# Patient Record
Sex: MALE
[De-identification: names, ages, dates, MRNs, and addresses within clinical notes are randomized per-mention and may not be internally consistent; named-entity substitution may affect disease eponyms.]

---

## 2019-03-25 ENCOUNTER — HOSPITAL ENCOUNTER (INPATIENT)
Dept: HOSPITAL 5 - ED | Age: 59
LOS: 4 days | Discharge: HOME | DRG: 682 | End: 2019-03-29
Attending: INTERNAL MEDICINE | Admitting: INTERNAL MEDICINE
Payer: COMMERCIAL

## 2019-03-25 DIAGNOSIS — E11.9: ICD-10-CM

## 2019-03-25 DIAGNOSIS — I48.91: ICD-10-CM

## 2019-03-25 DIAGNOSIS — M19.90: ICD-10-CM

## 2019-03-25 DIAGNOSIS — Z99.81: ICD-10-CM

## 2019-03-25 DIAGNOSIS — Z79.4: ICD-10-CM

## 2019-03-25 DIAGNOSIS — I27.20: ICD-10-CM

## 2019-03-25 DIAGNOSIS — N17.0: Primary | ICD-10-CM

## 2019-03-25 DIAGNOSIS — Z71.3: ICD-10-CM

## 2019-03-25 DIAGNOSIS — Z82.49: ICD-10-CM

## 2019-03-25 DIAGNOSIS — F17.213: ICD-10-CM

## 2019-03-25 DIAGNOSIS — K59.00: ICD-10-CM

## 2019-03-25 DIAGNOSIS — I13.0: ICD-10-CM

## 2019-03-25 DIAGNOSIS — E66.01: ICD-10-CM

## 2019-03-25 DIAGNOSIS — E11.22: ICD-10-CM

## 2019-03-25 DIAGNOSIS — Z79.01: ICD-10-CM

## 2019-03-25 DIAGNOSIS — I50.43: ICD-10-CM

## 2019-03-25 DIAGNOSIS — L97.322: ICD-10-CM

## 2019-03-25 DIAGNOSIS — J44.9: ICD-10-CM

## 2019-03-25 DIAGNOSIS — J96.20: ICD-10-CM

## 2019-03-25 DIAGNOSIS — N18.9: ICD-10-CM

## 2019-03-25 LAB
ALBUMIN SERPL-MCNC: 3.1 G/DL (ref 3.9–5)
ALT SERPL-CCNC: 13 UNITS/L (ref 7–56)
BASOPHILS # (AUTO): 0.1 K/MM3 (ref 0–0.1)
BASOPHILS NFR BLD AUTO: 0.9 % (ref 0–1.8)
BILIRUB UR QL STRIP: (no result)
BLOOD UR QL VISUAL: (no result)
BUN SERPL-MCNC: 57 MG/DL (ref 9–20)
BUN/CREAT SERPL: 30 %
CALCIUM SERPL-MCNC: 8.8 MG/DL (ref 8.4–10.2)
EOSINOPHIL # BLD AUTO: 0.1 K/MM3 (ref 0–0.4)
EOSINOPHIL NFR BLD AUTO: 1.3 % (ref 0–4.3)
HCT VFR BLD CALC: 54.6 % (ref 35.5–45.6)
HEMOLYSIS INDEX: 54
HGB BLD-MCNC: 16.9 GM/DL (ref 11.8–15.2)
INR PPP: 1.04 (ref 0.87–1.13)
LYMPHOCYTES # BLD AUTO: 1.2 K/MM3 (ref 1.2–5.4)
LYMPHOCYTES NFR BLD AUTO: 20.1 % (ref 13.4–35)
MCH RBC QN AUTO: 26 PG (ref 28–32)
MCHC RBC AUTO-ENTMCNC: 31 % (ref 32–34)
MCV RBC AUTO: 84 FL (ref 84–94)
MONOCYTES # (AUTO): 0.8 K/MM3 (ref 0–0.8)
MONOCYTES % (AUTO): 14 % (ref 0–7.3)
MUCOUS THREADS #/AREA URNS HPF: (no result) /HPF
PH UR STRIP: 5 [PH] (ref 5–7)
PLATELET # BLD: 152 K/MM3 (ref 140–440)
PROT UR STRIP-MCNC: (no result) MG/DL
RBC # BLD AUTO: 6.5 M/MM3 (ref 3.65–5.03)
RBC #/AREA URNS HPF: 3 /HPF (ref 0–6)
T4 FREE SERPL-MCNC: 1.15 NG/DL (ref 0.76–1.46)
UROBILINOGEN UR-MCNC: 2 MG/DL (ref ?–2)
WBC #/AREA URNS HPF: 1 /HPF (ref 0–6)

## 2019-03-25 PROCEDURE — 80053 COMPREHEN METABOLIC PANEL: CPT

## 2019-03-25 PROCEDURE — 81001 URINALYSIS AUTO W/SCOPE: CPT

## 2019-03-25 PROCEDURE — 94760 N-INVAS EAR/PLS OXIMETRY 1: CPT

## 2019-03-25 PROCEDURE — 93306 TTE W/DOPPLER COMPLETE: CPT

## 2019-03-25 PROCEDURE — 83735 ASSAY OF MAGNESIUM: CPT

## 2019-03-25 PROCEDURE — 80048 BASIC METABOLIC PNL TOTAL CA: CPT

## 2019-03-25 PROCEDURE — 36415 COLL VENOUS BLD VENIPUNCTURE: CPT

## 2019-03-25 PROCEDURE — 83036 HEMOGLOBIN GLYCOSYLATED A1C: CPT

## 2019-03-25 PROCEDURE — 84439 ASSAY OF FREE THYROXINE: CPT

## 2019-03-25 PROCEDURE — 93005 ELECTROCARDIOGRAM TRACING: CPT

## 2019-03-25 PROCEDURE — 96374 THER/PROPH/DIAG INJ IV PUSH: CPT

## 2019-03-25 PROCEDURE — 85025 COMPLETE CBC W/AUTO DIFF WBC: CPT

## 2019-03-25 PROCEDURE — 93010 ELECTROCARDIOGRAM REPORT: CPT

## 2019-03-25 PROCEDURE — 99406 BEHAV CHNG SMOKING 3-10 MIN: CPT

## 2019-03-25 PROCEDURE — 84443 ASSAY THYROID STIM HORMONE: CPT

## 2019-03-25 PROCEDURE — 83880 ASSAY OF NATRIURETIC PEPTIDE: CPT

## 2019-03-25 PROCEDURE — 80162 ASSAY OF DIGOXIN TOTAL: CPT

## 2019-03-25 PROCEDURE — 71046 X-RAY EXAM CHEST 2 VIEWS: CPT

## 2019-03-25 PROCEDURE — 85610 PROTHROMBIN TIME: CPT

## 2019-03-25 PROCEDURE — 99291 CRITICAL CARE FIRST HOUR: CPT

## 2019-03-25 PROCEDURE — 84484 ASSAY OF TROPONIN QUANT: CPT

## 2019-03-25 PROCEDURE — 82962 GLUCOSE BLOOD TEST: CPT

## 2019-03-25 PROCEDURE — 94640 AIRWAY INHALATION TREATMENT: CPT

## 2019-03-25 RX ADMIN — FUROSEMIDE SCH MG: 20 TABLET ORAL at 21:45

## 2019-03-25 RX ADMIN — Medication SCH ML: at 22:04

## 2019-03-25 RX ADMIN — CARVEDILOL SCH MG: 3.12 TABLET, FILM COATED ORAL at 22:02

## 2019-03-25 NOTE — EMERGENCY DEPARTMENT REPORT
Chief Complaint: Dyspnea/Respdistress


Stated Complaint: COPD/SWELLING/PAIN


Time Seen by Provider: 03/25/19 11:45





- HPI


History of Present Illness: 





states her doctor sent her here to be admitted. 


states he called ER "Dr FLETCHER"





co cp rad to neck





see EMR for hx





pt is complaining about a nurse with no SMILEY


VSS





MSE completed


MSE screening note: 


Focused history and physical exam performed.


Due to findings the following was ordered:











ED Disposition for MSE


Condition: Stable What Is The Reason For Today's Visit?: Surveillance against skin cancer recurrences How Many Skin Cancers Have You Had?: one

## 2019-03-25 NOTE — HISTORY AND PHYSICAL REPORT
History of Present Illness


Chief complaint: 





I feel bloated, short of breath and im constipated too. 


History of present illness: 


60 YO Male with MO, DM, Renal Failure, Atrial Fib on Therapeutic 

Anticoatulation(Xarelto), CHF, Nicotine Dependence presents to ED for 

evaluation. Pt states that he has experienced shortness of breath over the past 

1 month, with worsening symptoms over the past 2 weeks. Pt acknowledges 40lbs 

weight gain over the past 1 month, dypsnea on exertion, dypsnea at rest, leg 

edema, Orthopnea/PND, decreased exercise tolerance, as well as shortness of 

breath. Pt also acknowledge constipation over the past 4 days. Pt transported to

[facility] by private vehicle. Pt seen and evalauted in ED and found to have symptoms 

consistent with CHF Decompensation as well as Renal failure. Pt denies fever, 

chills, chest pain, palpitation,  abdominal pain, hematochezia, prolonged 

travel/immobility, unilateral leg swelling, calf pain, dysuria, frequency, 

individual/family history of DVT/PE/Blood Clotting Disorders, focal weakness, 

dysarthria. Pt admitted to telemetry and initiated on CHF protocol. Cardiology 

consulted in ED. Nephrology consulted in ED. Admission on 6/28/17 reviewed. All 

listed medication reconciled at time of admission. 








Past History


Past Medical History: atrial fib, diabetes, heart failure, renal failure


Past Surgical History: No surgical history, Other (reviewed)


Social history: single, smoking.  denies: alcohol abuse, prescription drug abuse


Family history: hypertension





Medications and Allergies


                                    Allergies











Allergy/AdvReac Type Severity Reaction Status Date / Time


 


Penicillins Allergy  Rash Verified 05/09/17 08:30











                                Home Medications











 Medication  Instructions  Recorded  Confirmed  Last Taken  Type


 


AtorvaSTATin [Lipitor] 10 mg PO HS 05/09/17 03/25/19 05/08/17 History


 


Rivaroxaban [Xarelto] 20 mg PO QDAY 05/09/17 03/25/19 05/08/17 History


 


Carvedilol [Coreg] 3.125 mg PO BID #60 tablet 06/29/17 03/25/19 Unknown Rx


 


Digoxin [Digox] 250 mcg PO DAILY 03/25/19 03/25/19 Unknown History


 


Insulin Detemir [Levemir VIAL] 20 unit SQ QHS 03/25/19 03/25/19 Unknown History


 


Ipratropium/Albuterol Sulfate 1 spray IH QID 03/25/19 03/25/19 Unknown History





[Combivent Respimat]     


 


Lispro Insulin [Humalog] 8 unit SQ AC 03/25/19 03/25/19 Unknown History


 


Rivaroxaban [Xarelto] 20 mg PO QDAY 03/25/19 03/25/19 Unknown History


 


Spironolactone [Aldactone] 25 mg PO QDAY 03/25/19 03/25/19 Unknown History


 


Torsemide [Demadex] 20 mg PO DAILY 03/25/19 03/25/19 Unknown History


 


metOLazone [Metolazone] 5 mg PO DAILY 03/25/19 03/25/19 Unknown History


 


predniSONE [Deltasone] 20 mg PO QDAY 03/25/19 03/25/19 Unknown History














Review of Systems


Constitutional: weight gain, no weight loss, no fever, no chills, no sweats, no 

night sweats


Ears, nose, mouth and throat: no ear pain, no ear discharge, no tinnitis, no 

decreased hearing, no nose pain


Cardiovascular: orthopnea, edema, shortness of breath, dyspnea on exertion, 

paroxysmal nocturnal dyspnea, leg edema, decreased exercise tolerance, no chest 

pain


Respiratory: no cough, no cough with sputum, no excessive sputum


Gastrointestinal: constipation, no abdominal pain, no nausea, no vomiting, no di

arrhea


Genitourinary Male: no dysuria, no hematuria, no flank pain, no urinary 

frequency, no urinary hesitancy


Rectal: no pain, no incontinence, no bleeding


Musculoskeletal: no neck stiffness, no neck pain, no shooting arm pain, no arm 

numbness/tingling, no low back pain, no shooting leg pain, no redness of joints


Integumentary: no rash, no pruritis, no redness, no sores, no wounds


Neurological: no head injury, no transient paralysis, no paralysis, no weakness,

 no parathesias, no tremors


Psychiatric: no anxiety, no memory loss, no change in appetite, no change in 

libido, no disorientation


Endocrine: no cold intolerance, no heat intolerance, no polyphagia, no excessive

 thirst, no polydipsia, no polyuria


Hematologic/Lymphatic: no easy bruising, no easy bleeding, no lymphadenopathy


Allergic/Immunologic: no urticaria, no allergic rhinitis, no anaphylaxis





Exam





- Constitutional


Vitals: 


                                        











Temp Pulse Resp BP Pulse Ox


 


 98.2 F   73   20   115/57   93 


 


 03/25/19 11:58  03/25/19 13:00  03/25/19 13:00  03/25/19 13:00  03/25/19 13:00











General appearance: Present: mild distress, obese





- EENT


Eyes: Present: PERRL


ENT: hearing intact, clear oral mucosa





- Neck


Neck: Present: supple, normal ROM





- Respiratory


Respiratory effort: labored


Respiratory: bilateral: diminished, rhonchi





- Cardiovascular


Rhythm: irregularly irregular


Heart Sounds: Present: S1 & S2.  Absent: rub, click





- Extremities


Extremity abnormal: edema


Peripheral Pulses: within normal limits





- Abdominal


General gastrointestinal: Present: soft, non-tender, non-distended, normal bowel

 sounds


Male genitourinary: Present: normal





- Integumentary


Integumentary: Present: clear, warm, dry





- Musculoskeletal


Musculoskeletal: gait normal, strength equal bilaterally





- Psychiatric


Psychiatric: appropriate mood/affect, intact judgment & insight





- Neurologic


Neurologic: CNII-XII intact, moves all extremities





Results





- Labs


CBC & Chem 7: 


                                 03/25/19 12:45





                                 03/25/19 12:45


Labs: 


                              Abnormal lab results











  03/25/19 03/25/19 03/25/19 Range/Units





  12:45 12:45 13:35 


 


RBC  6.50 H    (3.65-5.03)  M/mm3


 


Hgb  16.9 H    (11.8-15.2)  gm/dl


 


Hct  54.6 H    (35.5-45.6)  %


 


MCH  26 L    (28-32)  pg


 


MCHC  31 L    (32-34)  %


 


RDW  19.5 H    (13.2-15.2)  %


 


Mono % (Auto)  14.0 H    (0.0-7.3)  %


 


Sodium   136 L   (137-145)  mmol/L


 


Potassium   5.1 H   (3.6-5.0)  mmol/L


 


Chloride   94.0 L   ()  mmol/L


 


BUN   57 H   (9-20)  mg/dL


 


Creatinine   1.9 H   (0.8-1.5)  mg/dL


 


Glucose   199 H   ()  mg/dL


 


POC Glucose    186 H  ()  


 


NT-Pro-B Natriuret Pep   3763 H   (0-900)  pg/mL


 


Total Protein   6.1 L   (6.3-8.2)  g/dL


 


Albumin   3.1 L   (3.9-5)  g/dL














Assessment and Plan





- Patient Problems


(1) Acute exacerbation of CHF (congestive heart failure)


Current Visit: Yes   Status: Suspected   


Qualifiers: 


   Heart failure type: systolic   Qualified Code(s): I50.23 - Acute on chronic 

systolic (congestive) heart failure   


Plan to address problem: 


Admit to telemetry, Cardiology consulted in ED, digoxin level, thyroid panel, 

Echo, strict I/O, daily weight, afterload reduction, monitor for negative fluid 

balance, chest x ray, supplemental oxygen, BNP. 








(2) Nicotine dependence with withdrawal


Current Visit: Yes   Status: Acute   


Qualifiers: 


   Nicotine product type: cigarettes   Qualified Code(s): F17.213 - Nicotine 

dependence, cigarettes, with withdrawal   


Plan to address problem: 


Smoking cessation counseling, supportive care. 








(3) ARF (acute renal failure) with tubular necrosis


Current Visit: Yes   Status: Acute   


Plan to address problem: 


IVF resuscitation therapy as tolerated, Nephrology consulted in ED. monitor uop 

q shift,








(4) A-fib


Current Visit: No   Status: Acute   


Qualifiers: 


   Atrial fibrillation type: persistent   Qualified Code(s): I48.1 - Persistent 

atrial fibrillation   


Plan to address problem: 


Admit to telemetry, cardiology consulted, rate controlled, continue therapeutic 

anticoagulation, 








(5) Diabetes


Current Visit: No   Status: Acute   


Plan to address problem: 


ADA diet, insulin, accu check, hgb A1c








(6) Constipation


Current Visit: Yes   Status: Acute   


Qualifiers: 


   Constipation type: unspecified constipation type   Qualified Code(s): K59.00 

- Constipation, unspecified   


Plan to address problem: 


Bowel regimen, IVF resuscitation therapy as tolerated.








(7) DVT prophylaxis


Current Visit: Yes   Status: Acute   


Plan to address problem: 


SCD to BLE while in bed, therapeutic oral anticoagulation.

## 2019-03-25 NOTE — EMERGENCY DEPARTMENT REPORT
ED General Adult HPI





- General


Chief complaint: Dyspnea/Respdistress


Stated complaint: COPD/SWELLING/PAIN


Time Seen by Provider: 03/25/19 11:45


Source: patient, RN notes reviewed, old records reviewed


Mode of arrival: Wheelchair


Limitations: No Limitations





- History of Present Illness


Initial comments: 





This is a 59-year-old gentleman.





Primary care Dr.: Dr Thomas





Does not have a local cardiologist.





Past medical history: Obesity, diabetes, renal insufficiency, permanent atrial 

fibrillation, on systemic anticoagulation; xarelto





This is a pleasant 59-year-old gentleman who is not known to this provider 

previously.  The patient presents to the emergency room with a complaint of 

unintentional lower extremity swelling, scrotal swelling, shortness of breath, 

"I think I'm fluid overloaded."  He endorses an unintentional 40 pound weight 

gain within the past month or so.  The patient is a , however he 

endorses compliance with his systemic anticoagulation.  He denies headache, neck

pain, chest pain, abdominal pain.  He believes that he is constipated.  He also 

reports that his symptoms got worse approximately 2-3 weeks ago, after receiving

an intragluteal injection, on the right side, for "low testosterone."


-: Gradual


Location: left, right, lower extremity


Severity scale (0 -10): 6


Consistency: constant


Improves with: rest


Worsens with: movement


Associated Symptoms: shortness of breath.  denies: chest pain





- Related Data


                                Home Medications











 Medication  Instructions  Recorded  Confirmed  Last Taken


 


Albuterol Sulfate [Ventolin HFA] 2 puff INHALATION PRN 05/09/17 06/28/17 05/08/17


 


AtorvaSTATin [Lipitor] 10 mg PO HS 05/09/17 06/28/17 05/08/17


 


Rivaroxaban [Xarelto] 20 mg PO QDAY 05/09/17 06/28/17 05/08/17


 


Zolpidem [Ambien] 10 mg PO QHS 05/09/17 06/28/17 05/08/17


 


amLODIPine [Norvasc] 1 tab PO DAILY 05/09/17 06/28/17 Unknown


 


Spironolactone [Aldactone] 50 mg PO QDAY 06/28/17 06/28/17 Unknown








                                  Previous Rx's











 Medication  Instructions  Recorded  Last Taken  Type


 


Detemir (Nf) [Levemir (Nf)] 12 units SUB-Q QHS #30 units 05/11/17 Unknown Rx


 


Carvedilol [Coreg] 3.125 mg PO BID #60 tablet 06/29/17 Unknown Rx


 


HYDROcodone/APAP  [Norco 2 tab PO TID PRN #14 tablet 06/29/17 Unknown Rx





 mg TAB]    


 


Nicotine [Habitrol] 21 mg TD QDAY #14 patch 06/29/17 Unknown Rx


 


Torsemide [Demadex] 50 mg PO QDAY 30 Days  tablet 06/30/17 Unknown Rx











                                    Allergies











Allergy/AdvReac Type Severity Reaction Status Date / Time


 


Penicillins Allergy  Rash Verified 05/09/17 08:30














ED Review of Systems


ROS: 


Stated complaint: COPD/SWELLING/PAIN


Other details as noted in HPI





Constitutional: denies: fever


Eyes: denies: vision change


ENT: denies: epistaxis


Respiratory: shortness of breath


Cardiovascular: dyspnea on exertion, edema.  denies: chest pain, orthopnea


Gastrointestinal: constipation.  denies: abdominal pain


Skin: lesions (chronic skin lesion secondary to long-term anticoagulant use)


Neurological: weakness


Psychiatric: denies: anxiety





ED Past Medical Hx





- Past Medical History


Hx Hypertension: Yes


Hx Congestive Heart Failure: Yes


Hx Diabetes: Yes


Hx Arthritis: Yes


Hx COPD: Yes


Additional medical history: Back pain, Kidney insufficiency, Home oxygen, A fib





- Surgical History


Past Surgical History?: No





- Social History


Smoking Status: Unknown if ever smoked


Substance Use Type: None





- Medications


Home Medications: 


                                Home Medications











 Medication  Instructions  Recorded  Confirmed  Last Taken  Type


 


Albuterol Sulfate [Ventolin HFA] 2 puff INHALATION PRN 05/09/17 06/28/17 05/08/17 History


 


AtorvaSTATin [Lipitor] 10 mg PO HS 05/09/17 06/28/17 05/08/17 History


 


Rivaroxaban [Xarelto] 20 mg PO QDAY 05/09/17 06/28/17 05/08/17 History


 


Zolpidem [Ambien] 10 mg PO QHS 05/09/17 06/28/17 05/08/17 History


 


amLODIPine [Norvasc] 1 tab PO DAILY 05/09/17 06/28/17 Unknown History


 


Detemir (Nf) [Levemir (Nf)] 12 units SUB-Q QHS #30 units 05/11/17 06/28/17 

Unknown Rx


 


Spironolactone [Aldactone] 50 mg PO QDAY 06/28/17 06/28/17 Unknown History


 


Carvedilol [Coreg] 3.125 mg PO BID #60 tablet 06/29/17  Unknown Rx


 


HYDROcodone/APAP  [Norco 2 tab PO TID PRN #14 tablet 06/29/17  Unknown Rx





 mg TAB]     


 


Nicotine [Habitrol] 21 mg TD QDAY #14 patch 06/29/17  Unknown Rx


 


Torsemide [Demadex] 50 mg PO QDAY 30 Days  tablet 06/30/17  Unknown Rx














ED Physical Exam





- General


Limitations: No Limitations


General appearance: alert, in no apparent distress, obese





- Head


Head exam: Present: atraumatic, normocephalic





- Eye


Eye exam: Present: normal appearance, EOMI.  Absent: nystagmus





- ENT


ENT exam: Present: normal exam, normal orophraynx, mucous membranes moist, 

normal external ear exam





- Neck


Neck exam: Present: normal inspection, full ROM.  Absent: tenderness, 

meningismus





- Respiratory


Respiratory exam: Present: rales, rhonchi.  Absent: respiratory distress





- Cardiovascular


Cardiovascular Exam: Present: tachycardia, irregular rhythm, normal heart 

sounds.  Absent: systolic murmur, diastolic murmur, rubs, gallop





- GI/Abdominal


GI/Abdominal exam: Present: soft.  Absent: distended, tenderness, guarding, 

rebound, rigid, pulsatile mass





- Rectal


Rectal exam: Present: deferred





- 


 exam: Present: scrotal swelling





- Extremities Exam


Extremities exam: Present: normal inspection (chronic discoloration noted), 

pedal edema (3+ pulses noted in the lower extremities), other (2+ pulses noted 

in the bilateral upper, lower extremities.  Compartments soft.  No long bony 

tenderness.  The pelvis is stable.).  Absent: calf tenderness





- Back Exam


Back exam: Present: normal inspection, full ROM.  Absent: tenderness, CVA 

tenderness (R), paraspinal tenderness, vertebral tenderness





- Neurological Exam


Neurological exam: Present: alert, oriented X3, CN II-XII intact, normal gait, 

other (Extraocular movements intact.  Tongue midline.  No facial droop.  Facial 

sensation intact to light touch in the V1, V2, V3 distribution bilaterally.  5 

and 5 strength in 4 extremities..  Sensation is intact to light touch in 4 

extremities.).  Absent: motor sensory deficit





- Psychiatric


Psychiatric exam: Present: normal affect, normal mood





- Skin


Skin exam: Present: warm, dry, intact, normal color.  Absent: rash





ED Course


                                   Vital Signs











  03/25/19 03/25/19 03/25/19





  11:58 12:51 13:00


 


Temperature 98.2 F  


 


Pulse Rate 103 H  73


 


Respiratory 24  20





Rate   


 


Blood Pressure 135/70  115/57


 


O2 Sat by Pulse 81 L 93 93





Oximetry   














ED Medical Decision Making





- Lab Data


Result diagrams: 


                                 03/25/19 12:45





                                 03/25/19 12:45








                                   Vital Signs











  03/25/19 03/25/19 03/25/19





  11:58 12:51 13:00


 


Temperature 98.2 F  


 


Pulse Rate 103 H  73


 


Respiratory 24  20





Rate   


 


Blood Pressure 135/70  115/57


 


O2 Sat by Pulse 81 L 93 93





Oximetry   











                                   Lab Results











  03/25/19 03/25/19 03/25/19 Range/Units





  12:35 12:45 12:45 


 


WBC   5.9   (4.5-11.0)  K/mm3


 


RBC   6.50 H   (3.65-5.03)  M/mm3


 


Hgb   16.9 H   (11.8-15.2)  gm/dl


 


Hct   54.6 H   (35.5-45.6)  %


 


MCV   84   (84-94)  fl


 


MCH   26 L   (28-32)  pg


 


MCHC   31 L   (32-34)  %


 


RDW   19.5 H   (13.2-15.2)  %


 


Plt Count   152   (140-440)  K/mm3


 


Lymph % (Auto)   20.1   (13.4-35.0)  %


 


Mono % (Auto)   14.0 H   (0.0-7.3)  %


 


Eos % (Auto)   1.3   (0.0-4.3)  %


 


Baso % (Auto)   0.9   (0.0-1.8)  %


 


Lymph #   1.2   (1.2-5.4)  K/mm3


 


Mono #   0.8   (0.0-0.8)  K/mm3


 


Eos #   0.1   (0.0-0.4)  K/mm3


 


Baso #   0.1   (0.0-0.1)  K/mm3


 


Seg Neutrophils %   63.7   (40.0-70.0)  %


 


Seg Neutrophils #   3.7   (1.8-7.7)  K/mm3


 


PT     (12.2-14.9)  Sec.


 


INR     (0.87-1.13)  


 


Sodium    136 L  (137-145)  mmol/L


 


Potassium    5.1 H  (3.6-5.0)  mmol/L


 


Chloride    94.0 L  ()  mmol/L


 


Carbon Dioxide    29  (22-30)  mmol/L


 


Anion Gap    18  mmol/L


 


BUN    57 H  (9-20)  mg/dL


 


Creatinine    1.9 H  (0.8-1.5)  mg/dL


 


Estimated GFR    37  ml/min


 


BUN/Creatinine Ratio    30  %


 


Glucose    199 H  ()  mg/dL


 


POC Glucose     ()  


 


Calcium    8.8  (8.4-10.2)  mg/dL


 


Magnesium    2.20  (1.7-2.3)  mg/dL


 


Total Bilirubin    0.60  (0.1-1.2)  mg/dL


 


AST    19  (5-40)  units/L


 


ALT    13  (7-56)  units/L


 


Alkaline Phosphatase    63  ()  units/L


 


Troponin T    < 0.010  (0.00-0.029)  ng/mL


 


NT-Pro-B Natriuret Pep    3763 H  (0-900)  pg/mL


 


Total Protein    6.1 L  (6.3-8.2)  g/dL


 


Albumin    3.1 L  (3.9-5)  g/dL


 


Albumin/Globulin Ratio    1.0  %


 


Urine Color  Yellow    (Yellow)  


 


Urine Turbidity  Clear    (Clear)  


 


Urine pH  5.0    (5.0-7.0)  


 


Ur Specific Gravity  1.013    (1.003-1.030)  


 


Urine Protein  >2000 mg dl    (Negative)  mg/dL


 


Urine Glucose (UA)  Neg    (Negative)  mg/dL


 


Urine Ketones  Neg    (Negative)  mg/dL


 


Urine Blood  Neg    (Negative)  


 


Urine Nitrite  Neg    (Negative)  


 


Urine Bilirubin  Neg    (Negative)  


 


Urine Urobilinogen  2.0    (<2.0)  mg/dL


 


Ur Leukocyte Esterase  Neg    (Negative)  


 


Urine WBC (Auto)  1.0    (0.0-6.0)  /HPF


 


Urine RBC (Auto)  3.0    (0.0-6.0)  /HPF


 


U Epithel Cells (Auto)  < 1.0    (0-13.0)  /HPF


 


Urine Mucus  Few    /HPF














  03/25/19 03/25/19 Range/Units





  12:45 13:35 


 


WBC    (4.5-11.0)  K/mm3


 


RBC    (3.65-5.03)  M/mm3


 


Hgb    (11.8-15.2)  gm/dl


 


Hct    (35.5-45.6)  %


 


MCV    (84-94)  fl


 


MCH    (28-32)  pg


 


MCHC    (32-34)  %


 


RDW    (13.2-15.2)  %


 


Plt Count    (140-440)  K/mm3


 


Lymph % (Auto)    (13.4-35.0)  %


 


Mono % (Auto)    (0.0-7.3)  %


 


Eos % (Auto)    (0.0-4.3)  %


 


Baso % (Auto)    (0.0-1.8)  %


 


Lymph #    (1.2-5.4)  K/mm3


 


Mono #    (0.0-0.8)  K/mm3


 


Eos #    (0.0-0.4)  K/mm3


 


Baso #    (0.0-0.1)  K/mm3


 


Seg Neutrophils %    (40.0-70.0)  %


 


Seg Neutrophils #    (1.8-7.7)  K/mm3


 


PT  14.2   (12.2-14.9)  Sec.


 


INR  1.04   (0.87-1.13)  


 


Sodium    (137-145)  mmol/L


 


Potassium    (3.6-5.0)  mmol/L


 


Chloride    ()  mmol/L


 


Carbon Dioxide    (22-30)  mmol/L


 


Anion Gap    mmol/L


 


BUN    (9-20)  mg/dL


 


Creatinine    (0.8-1.5)  mg/dL


 


Estimated GFR    ml/min


 


BUN/Creatinine Ratio    %


 


Glucose    ()  mg/dL


 


POC Glucose   186 H  ()  


 


Calcium    (8.4-10.2)  mg/dL


 


Magnesium    (1.7-2.3)  mg/dL


 


Total Bilirubin    (0.1-1.2)  mg/dL


 


AST    (5-40)  units/L


 


ALT    (7-56)  units/L


 


Alkaline Phosphatase    ()  units/L


 


Troponin T    (0.00-0.029)  ng/mL


 


NT-Pro-B Natriuret Pep    (0-900)  pg/mL


 


Total Protein    (6.3-8.2)  g/dL


 


Albumin    (3.9-5)  g/dL


 


Albumin/Globulin Ratio    %


 


Urine Color    (Yellow)  


 


Urine Turbidity    (Clear)  


 


Urine pH    (5.0-7.0)  


 


Ur Specific Gravity    (1.003-1.030)  


 


Urine Protein    (Negative)  mg/dL


 


Urine Glucose (UA)    (Negative)  mg/dL


 


Urine Ketones    (Negative)  mg/dL


 


Urine Blood    (Negative)  


 


Urine Nitrite    (Negative)  


 


Urine Bilirubin    (Negative)  


 


Urine Urobilinogen    (<2.0)  mg/dL


 


Ur Leukocyte Esterase    (Negative)  


 


Urine WBC (Auto)    (0.0-6.0)  /HPF


 


Urine RBC (Auto)    (0.0-6.0)  /HPF


 


U Epithel Cells (Auto)    (0-13.0)  /HPF


 


Urine Mucus    /HPF














- EKG Data


-: EKG Interpreted by Me


Rate: tachycardia





- EKG Data





03/25/19 13:35


Normal axis, atrial fibrillation, normal intervals, low voltage, motion 

artifact, not consistent with ST elevation myocardial infarction.





- Radiology Data


Radiology results: pending, report reviewed, image reviewed


interpreted by me: 





X-ray of the chest shows pulmonary vascular congestion





- Medical Decision Making





Differential diagnosis, including but not limited to: Cardiorenal syndrome, 

congestive heart failure, fluid overload





Assessment and plan: 39-year-old gentleman with history and physical suggestive 

of congestive heart failure exacerbation, and fluid overload.  While I am 

talking to the patient, he is saturating at 90-92% on supplemental oxygen.  He 

is on systemic anticoagulation, and endorses no pulmonary embolus or DVT risk 

factors.  He will be treated with supplemental oxygen, supportive care, and 

aggressive IV diuresis.  I have recommended diet last somewhat occasions to the 

patient, an aggressive weight loss.





He is amenable to hospitalization.





Hospital physician, Dr. Nobles has graciously accepted the patient to the medical

 service for decompensated congestive heart failure.


Critical Care Time: Yes


Critical care time in (mins) excluding proc time.: 35


Critical care attestation.: 


If time is entered above; I have spent that time in minutes in the direct care o

f this critically ill patient, excluding procedure time.








ED Disposition


Clinical Impression: 


 Chronic atrial fibrillation, Permanent atrial fibrillation, O2 dependent, 

Anticoagulant long-term use, Morbid obesity, Acute exacerbation of CHF 

(congestive heart failure)





Disposition: [id number] OP ADMIT IP TO THIS HOSP


Is pt being admited?: Yes


Condition: Good


Referrals: 


PRIMARY CARE,MD [Referring] - 3-5 Days

## 2019-03-25 NOTE — XRAY REPORT
ROUTINE CHEST, TWO VIEWS:



HISTORY:  chest pain.



Moderate cardiomegaly and mild pulmonary venous congestion are 

identified which appear relatively stable since 6/28/17. No evidence 

for consolidation, large pleural effusion or pneumothorax. The bony 

structures are grossly intact.



IMPRESSION:

Cardiomegaly and pulmonary venous congestion. No overwhelming change 

since 6/28/17.

## 2019-03-26 LAB
ALBUMIN SERPL-MCNC: 3 G/DL (ref 3.9–5)
ALT SERPL-CCNC: 11 UNITS/L (ref 7–56)
BUN SERPL-MCNC: 58 MG/DL (ref 9–20)
BUN/CREAT SERPL: 31 %
CALCIUM SERPL-MCNC: 8.9 MG/DL (ref 8.4–10.2)
HEMOLYSIS INDEX: 7

## 2019-03-26 RX ADMIN — FUROSEMIDE SCH MG: 10 INJECTION, SOLUTION INTRAVENOUS at 21:18

## 2019-03-26 RX ADMIN — FUROSEMIDE SCH MG: 20 TABLET ORAL at 06:59

## 2019-03-26 RX ADMIN — CARVEDILOL SCH MG: 3.12 TABLET, FILM COATED ORAL at 21:19

## 2019-03-26 RX ADMIN — CARVEDILOL SCH: 3.12 TABLET, FILM COATED ORAL at 12:50

## 2019-03-26 RX ADMIN — FUROSEMIDE SCH MG: 10 INJECTION, SOLUTION INTRAVENOUS at 16:30

## 2019-03-26 RX ADMIN — METOLAZONE SCH: 5 TABLET ORAL at 12:51

## 2019-03-26 RX ADMIN — DIGOXIN SCH MG: 250 TABLET ORAL at 12:48

## 2019-03-26 RX ADMIN — SPIRONOLACTONE SCH: 25 TABLET ORAL at 12:50

## 2019-03-26 RX ADMIN — Medication SCH ML: at 12:51

## 2019-03-26 RX ADMIN — PREDNISONE SCH MG: 20 TABLET ORAL at 12:48

## 2019-03-26 RX ADMIN — RIVAROXABAN SCH MG: 20 TABLET, FILM COATED ORAL at 12:48

## 2019-03-26 RX ADMIN — Medication SCH ML: at 21:20

## 2019-03-26 NOTE — PROGRESS NOTE
Assessment and Plan


Assessment and plan: 


Acute on chronic resp failure due to CHF exacerbation.


Supplemental Oxygen





Acute on chronic diastolic CHF.


Lasix iv


Coreg,


Aldactone


cardiology following





Acute on CKD.


Monitor 


Nephrology on board





Chronic afib


Continue Xarelto





DM type 2


Fingerstick glucose q ac and hs





HTN


Monitor BP





Full code








History


Interval history: 


Less shortness of breath,


No pratik pain








Hospitalist Physical





- Physical exam


Narrative exam: 


GEN: Not in acute distress, sitting up in bed, morbidly obese


HEENT: Normocephalic, atraumatic, 


Neck: supple, No JVD


heart: S1 and S2 ireg,irreg,  no murmurs, rubs or gallop


Lungs: Bilateral basal crackles Clear to auscultation bilaterally, no wheeze 


Abd:soft, non tender, non distended, normal bowel sounds


Ext: Bilateral lower ext edema, chronic changes, no cyanosis, 


Neuro:Awake,alert,oriented X 3, no focal signs, moves all ext


Psych: normal mood











- Constitutional


Vitals: 


                                        











Temp Pulse Resp BP Pulse Ox


 


 97.7 F   66   18   121/53   96 


 


 03/26/19 12:15  03/26/19 12:50  03/26/19 12:15  03/26/19 12:50  03/26/19 10:00











General appearance: Present: no acute distress





Results





- Labs


CBC & Chem 7: 


                                 03/25/19 12:45





                                 03/26/19 04:54


Labs: 


                             Laboratory Last Values











WBC  5.9 K/mm3 (4.5-11.0)   03/25/19  12:45    


 


RBC  6.50 M/mm3 (3.65-5.03)  H  03/25/19  12:45    


 


Hgb  16.9 gm/dl (11.8-15.2)  H  03/25/19  12:45    


 


Hct  54.6 % (35.5-45.6)  H  03/25/19  12:45    


 


MCV  84 fl (84-94)   03/25/19  12:45    


 


MCH  26 pg (28-32)  L  03/25/19  12:45    


 


MCHC  31 % (32-34)  L  03/25/19  12:45    


 


RDW  19.5 % (13.2-15.2)  H  03/25/19  12:45    


 


Plt Count  152 K/mm3 (140-440)   03/25/19  12:45    


 


Lymph % (Auto)  20.1 % (13.4-35.0)   03/25/19  12:45    


 


Mono % (Auto)  14.0 % (0.0-7.3)  H  03/25/19  12:45    


 


Eos % (Auto)  1.3 % (0.0-4.3)   03/25/19  12:45    


 


Baso % (Auto)  0.9 % (0.0-1.8)   03/25/19  12:45    


 


Lymph #  1.2 K/mm3 (1.2-5.4)   03/25/19  12:45    


 


Mono #  0.8 K/mm3 (0.0-0.8)   03/25/19  12:45    


 


Eos #  0.1 K/mm3 (0.0-0.4)   03/25/19  12:45    


 


Baso #  0.1 K/mm3 (0.0-0.1)   03/25/19  12:45    


 


Seg Neutrophils %  63.7 % (40.0-70.0)   03/25/19  12:45    


 


Seg Neutrophils #  3.7 K/mm3 (1.8-7.7)   03/25/19  12:45    


 


PT  14.2 Sec. (12.2-14.9)   03/25/19  12:45    


 


INR  1.04  (0.87-1.13)   03/25/19  12:45    


 


Sodium  136 mmol/L (137-145)  L  03/26/19  04:54    


 


Potassium  4.5 mmol/L (3.6-5.0)   03/26/19  04:54    


 


Chloride  94.4 mmol/L ()  L  03/26/19  04:54    


 


Carbon Dioxide  33 mmol/L (22-30)  H  03/26/19  04:54    


 


Anion Gap  13 mmol/L  03/26/19  04:54    


 


BUN  58 mg/dL (9-20)  H  03/26/19  04:54    


 


Creatinine  1.9 mg/dL (0.8-1.5)  H  03/26/19  04:54    


 


Estimated GFR  37 ml/min  03/26/19  04:54    


 


BUN/Creatinine Ratio  31 %  03/26/19  04:54    


 


Glucose  117 mg/dL ()  H  03/26/19  04:54    


 


POC Glucose  192  ()  H  03/26/19  11:39    


 


Hemoglobin A1c  9.1 % (4-6)  H  03/25/19  15:30    


 


Calcium  8.9 mg/dL (8.4-10.2)   03/26/19  04:54    


 


Magnesium  2.20 mg/dL (1.7-2.3)   03/25/19  12:45    


 


Total Bilirubin  0.80 mg/dL (0.1-1.2)   03/26/19  04:54    


 


AST  11 units/L (5-40)   03/26/19  04:54    


 


ALT  11 units/L (7-56)   03/26/19  04:54    


 


Alkaline Phosphatase  59 units/L ()   03/26/19  04:54    


 


Troponin T  < 0.010 ng/mL (0.00-0.029)   03/25/19  12:45    


 


NT-Pro-B Natriuret Pep  3763 pg/mL (0-900)  H  03/25/19  12:45    


 


Total Protein  5.9 g/dL (6.3-8.2)  L  03/26/19  04:54    


 


Albumin  3.0 g/dL (3.9-5)  L  03/26/19  04:54    


 


Albumin/Globulin Ratio  1.0 %  03/26/19  04:54    


 


TSH  2.240 mlU/mL (0.270-4.200)   03/25/19  15:30    


 


Free T4  1.15 ng/dL (0.76-1.46)   03/25/19  15:30    


 


Urine Color  Yellow  (Yellow)   03/25/19  12:35    


 


Urine Turbidity  Clear  (Clear)   03/25/19  12:35    


 


Urine pH  5.0  (5.0-7.0)   03/25/19  12:35    


 


Ur Specific Gravity  1.013  (1.003-1.030)   03/25/19  12:35    


 


Urine Protein  >2000 mg dl mg/dL (Negative)   03/25/19  12:35    


 


Urine Glucose (UA)  Neg mg/dL (Negative)   03/25/19  12:35    


 


Urine Ketones  Neg mg/dL (Negative)   03/25/19  12:35    


 


Urine Blood  Neg  (Negative)   03/25/19  12:35    


 


Urine Nitrite  Neg  (Negative)   03/25/19  12:35    


 


Urine Bilirubin  Neg  (Negative)   03/25/19  12:35    


 


Urine Urobilinogen  2.0 mg/dL (<2.0)   03/25/19  12:35    


 


Ur Leukocyte Esterase  Neg  (Negative)   03/25/19  12:35    


 


Urine WBC (Auto)  1.0 /HPF (0.0-6.0)   03/25/19  12:35    


 


Urine RBC (Auto)  3.0 /HPF (0.0-6.0)   03/25/19  12:35    


 


U Epithel Cells (Auto)  < 1.0 /HPF (0-13.0)   03/25/19  12:35    


 


Urine Mucus  Few /HPF  03/25/19  12:35    


 


Digoxin  0.9 ng/mL (0.9-2.0)   03/25/19  12:45    














Active Medications





- Current Medications


Current Medications: 














Generic Name Dose Route Start Last Admin





  Trade Name Freq  PRN Reason Stop Dose Admin


 


Acetaminophen  650 mg  03/25/19 13:39 





  Tylenol  PO  





  Q4H PRN  





  Pain MILD(1-3)/Fever >100.5/HA  


 


Albuterol  2.5 mg  03/25/19 13:39 





  Proventil  IH  





  Q4HRT PRN  





  Shortness Of Breath  


 


Aspirin  81 mg  03/27/19 10:00 





  Baby Aspirin  PO  





  QDAY ALEM  


 


Atorvastatin Calcium  10 mg  03/25/19 22:00  03/25/19 21:45





  Lipitor  PO   10 mg





  HS ALEM   Administration


 


Bisacodyl  5 mg  03/25/19 14:28 





  Dulcolax  PO  03/27/19 23:59 





  QDAY PRN  





  Constipation  


 


Carvedilol  3.125 mg  03/25/19 22:00  03/26/19 12:50





  Coreg  PO   Not Given





  BID [facility]  


 


Dextrose  50 ml  03/25/19 13:52 





  D50w (25gm) Syringe  IV  





  PRN PRN  





  Hypoglycemia  


 


Digoxin  0.25 mg  03/26/19 10:00  03/26/19 12:48





  Lanoxin  PO   0.25 mg





  DAILY ALEM   Administration


 


Furosemide  40 mg  03/26/19 13:00 





  Lasix  IV  





  BID ALEM  


 


Insulin Human Lispro  0 unit  03/25/19 16:30  03/26/19 12:50





  Humalog  SUB-Q   2 unit





  [facility] ALEM   Administration





  Protocol  


 


Metolazone  5 mg  03/26/19 10:00  03/26/19 12:51





  Zaroxolyn  PO   Not Given





  DAILY [facility]  


 


Ondansetron HCl  4 mg  03/25/19 13:39 





  Zofran  IV  





  Q8H PRN  





  Nausea And Vomiting  


 


Prednisone  20 mg  03/26/19 10:00  03/26/19 12:48





  Deltasone  PO   20 mg





  QDAY ALEM   Administration


 


Rivaroxaban  20 mg  03/26/19 10:00  03/26/19 12:48





  Xarelto  PO   20 mg





  QDAY ALEM   Administration





  Protocol  


 


Senna/Docusate Sodium  2 tab  03/25/19 14:27 





  Senokot S  PO  





  Q12H PRN  





  Laxative Effect  


 


Sodium Chloride  10 ml  03/25/19 22:00  03/26/19 12:51





  Sodium Chloride Flush Syringe 10 Ml  IV   10 ml





  BID ALEM   Administration


 


Sodium Chloride  10 ml  03/25/19 13:39 





  Sodium Chloride Flush Syringe 10 Ml  IV  





  PRN PRN  





  LINE FLUSH  


 


Spironolactone  25 mg  03/26/19 10:00  03/26/19 12:50





  Aldactone  PO   Not Given





  QDAY ALEM

## 2019-03-26 NOTE — CONSULTATION
History of Present Illness


Consult date: 03/26/19


Requesting physician: CARLYLE GRIMES


Consult reason: congestive heart failure


History of present illness: 


The pt is a 60 YO male with a past medical history significant for permanent 

atrial fibrillation for which he is on Xarelto, HTN, HLP, DM, CKD, COPD, chronic

respiratory failure requiring home O2 (wears 2-3L), DHF, pulmonary HTN, chronic 

BLE edema, tobacco use. He was last seen by Dr. Sandoval in our office in 7/2017. 

He presented with c/o SOB, IGLESIAS, PND, orthopea, BLE and scrotal edema x several 

days PTA. He denies any chest pain, palpitations, n/v, diaphoresis, dizziness or

syncope. He reports compliance with his home medication regimen. 





Echo done 8/2016 showed EF 55-60%, abnormal diastolic function, mild LVH, LA mod

dilated, RA mod dilated, RV mod dilated, mild MR, mod TR, severe pulm HTN with 

RVSP 97mmHg. 








Past History


Past Medical History: atrial fib, COPD, diabetes, heart failure, hypertension, 

hyperlipidemia


Social history: single, smoking.  denies: alcohol abuse, prescription drug abuse


Family history: hypertension





Medications and Allergies


                                    Allergies











Allergy/AdvReac Type Severity Reaction Status Date / Time


 


Penicillins Allergy  Rash Verified 05/09/17 08:30











                                Home Medications











 Medication  Instructions  Recorded  Confirmed  Last Taken  Type


 


AtorvaSTATin [Lipitor] 10 mg PO HS 05/09/17 03/25/19 05/08/17 History


 


Rivaroxaban [Xarelto] 20 mg PO QDAY 05/09/17 03/25/19 05/08/17 History


 


Carvedilol [Coreg] 3.125 mg PO BID #60 tablet 06/29/17 03/25/19 Unknown Rx


 


Digoxin [Digox] 250 mcg PO DAILY 03/25/19 03/25/19 Unknown History


 


Insulin Detemir [Levemir VIAL] 20 unit SQ QHS 03/25/19 03/25/19 Unknown History


 


Ipratropium/Albuterol Sulfate 1 spray IH QID 03/25/19 03/25/19 Unknown History





[Combivent Respimat]     


 


Lispro Insulin [Humalog] 8 unit SQ AC 03/25/19 03/25/19 Unknown History


 


Rivaroxaban [Xarelto] 20 mg PO QDAY 03/25/19 03/25/19 Unknown History


 


Spironolactone [Aldactone] 25 mg PO QDAY 03/25/19 03/25/19 Unknown History


 


Torsemide [Demadex] 20 mg PO DAILY 03/25/19 03/25/19 Unknown History


 


metOLazone [Metolazone] 5 mg PO DAILY 03/25/19 03/25/19 Unknown History


 


predniSONE [Deltasone] 20 mg PO QDAY 03/25/19 03/25/19 Unknown History











Active Meds: 


Active Medications





Acetaminophen (Tylenol)  650 mg PO Q4H PRN


   PRN Reason: Pain MILD(1-3)/Fever >100.5/HA


Albuterol (Proventil)  2.5 mg IH Q4HRT PRN


   PRN Reason: Shortness Of Breath


Atorvastatin Calcium (Lipitor)  10 mg PO HS [facility]


   Last Admin: 03/25/19 21:45 Dose:  10 mg


   Documented by: 


Bisacodyl (Dulcolax)  5 mg PO QDAY PRN


   PRN Reason: Constipation


   Stop: 03/27/19 23:59


Carvedilol (Coreg)  3.125 mg PO BID [facility]


   Last Admin: 03/26/19 12:50 Dose:  Not Given


   Documented by: 


Dextrose (D50w (25gm) Syringe)  50 ml IV PRN PRN


   PRN Reason: Hypoglycemia


Digoxin (Lanoxin)  0.25 mg PO DAILY [facility]


   Last Admin: 03/26/19 12:48 Dose:  0.25 mg


   Documented by: 


Furosemide (Lasix)  40 mg IV BID [facility]


Insulin Human Lispro (Humalog)  0 unit SUB-Q ACHS [facility]; Protocol


   Last Admin: 03/26/19 12:50 Dose:  2 unit


   Documented by: 


Metolazone (Zaroxolyn)  5 mg PO DAILY [facility]


   Last Admin: 03/26/19 12:51 Dose:  Not Given


   Documented by: 


Ondansetron HCl (Zofran)  4 mg IV Q8H PRN


   PRN Reason: Nausea And Vomiting


Prednisone (Deltasone)  20 mg PO QDAY [facility]


   Last Admin: 03/26/19 12:48 Dose:  20 mg


   Documented by: 


Rivaroxaban (Xarelto)  20 mg PO QDAY [facility]; Protocol


   Last Admin: 03/26/19 12:48 Dose:  20 mg


   Documented by: 


Senna/Docusate Sodium (Senokot S)  2 tab PO Q12H PRN


   PRN Reason: Laxative Effect


Sodium Chloride (Sodium Chloride Flush Syringe 10 Ml)  10 ml IV BID [facility]


   Last Admin: 03/26/19 12:51 Dose:  10 ml


   Documented by: 


Sodium Chloride (Sodium Chloride Flush Syringe 10 Ml)  10 ml IV PRN PRN


   PRN Reason: LINE FLUSH


Spironolactone (Aldactone)  25 mg PO QDAY [facility]


   Last Admin: 03/26/19 12:50 Dose:  Not Given


   Documented by: 











Review of Systems


Constitutional: weight gain, no fever, no chills, no sweats


Ears, nose, mouth and throat: no ear pain, no nose pain, no sinus pressure, no 

sinus pain


Cardiovascular: orthopnea, edema, shortness of breath, dyspnea on exertion, 

paroxysmal nocturnal dyspnea, high blood pressure, leg edema, decreased exercise

 tolerance, no chest pain, no palpitations, no rapid/irregular heart beat, no 

syncope, no lightheadedness


Respiratory: shortness of breath, dyspnea on exertion, no cough, no congestion, 

no wheezing, no pain on inspiration


Gastrointestinal: no abdominal pain, no nausea, no vomiting, no diarrhea, no 

constipation, no change in bowel habits


Genitourinary Male: other (scrotal swelling), no dysuria, no hematuria, no flank

 pain, no discharge, no urinary frequency, no urinary hesitancy


Musculoskeletal: no neck stiffness, no neck pain, no shooting arm pain, no arm 

numbness/tingling, no low back pain, no shooting leg pain


Integumentary: no wounds


Neurological: no head injury, no paralysis, no weakness, no parathesias, no 

numbness, no tingling, no seizures, no syncope


Psychiatric: no anxiety


Endocrine: no cold intolerance, no heat intolerance


Hematologic/Lymphatic: no easy bruising, no easy bleeding


Allergic/Immunologic: no urticaria





Physical Examination


                                   Vital Signs











Temp Pulse Resp BP Pulse Ox


 


 98.2 F   103 H  24   135/70   81 L


 


 03/25/19 11:58  03/25/19 11:58  03/25/19 11:58  03/25/19 11:58  03/25/19 11:58











General appearance: no acute distress


HEENT: Positive: PERRL, Normocephaly, Mucus Membranes Moist


Neck: Positive: neck supple, trachea midline


Cardiac: Positive: irregularly irregular, S1/S2


Lungs: Positive: Decreased Breath Sounds


Neuro: Positive: Grossly Intact


Abdomen: Negative: Tender


Skin: Positive: Other (BLE chronic venous stasis skin changes noted).  Negative:

 Wound


Extremities: Present: +3 Edema (BLE)





Results





                                 03/25/19 12:45





                                 03/26/19 04:54


                                 Cardiac Enzymes











  03/26/19 Range/Units





  04:54 


 


AST  11  (5-40)  units/L








                          Comprehensive Metabolic Panel











  03/26/19 Range/Units





  04:54 


 


Sodium  136 L  (137-145)  mmol/L


 


Potassium  4.5  (3.6-5.0)  mmol/L


 


Chloride  94.4 L  ()  mmol/L


 


Carbon Dioxide  33 H  (22-30)  mmol/L


 


BUN  58 H  (9-20)  mg/dL


 


Creatinine  1.9 H  (0.8-1.5)  mg/dL


 


Glucose  117 H  ()  mg/dL


 


Calcium  8.9  (8.4-10.2)  mg/dL


 


AST  11  (5-40)  units/L


 


ALT  11  (7-56)  units/L


 


Alkaline Phosphatase  59  ()  units/L


 


Total Protein  5.9 L  (6.3-8.2)  g/dL


 


Albumin  3.0 L  (3.9-5)  g/dL














- Imaging and Cardiology


Echo: report reviewed (8/2016 showed EF 55-60%, abnormal diastolic function, 

mild LVH, LA mod dilated, RA mod dilated, RV mod dilated, mild MR, mod TR, 

severe pulm HTN with RVSP 97mmHg. )


EKG: report reviewed, image reviewed





EKG interpretations





- Telemetry


EKG Rhythm: Atrial Fibrillation





- EKG


Supraventricular dysrhythmia: atrial fibrillation





Assessment and Plan


Agree with present cardiac management. F/u echo. 





The patient has been seen in conjunction with Dr. ELIER Cortes who agrees with the 

assessment and plan of care.








- Patient Problems


(1) Acute heart failure with preserved ejection fraction


Current Visit: Yes   Status: Acute   





(2) COPD (chronic obstructive pulmonary disease)


Current Visit: Yes   Status: Chronic   





(3) Chronic respiratory failure


Current Visit: Yes   Status: Chronic   





(4) Severe pulmonary arterial systolic hypertension


Current Visit: Yes   Status: Chronic   





(5) CKD (chronic kidney disease)


Current Visit: Yes   Status: Chronic   





(6) Hypertension


Current Visit: Yes   Status: Chronic   


Qualifiers: 


   Hypertension type: essential hypertension   Qualified Code(s): I10 - 

Essential (primary) hypertension   





(7) Hyperlipemia


Current Visit: Yes   Status: Chronic   





(8) Diabetes mellitus


Current Visit: Yes   Status: Chronic   


Qualifiers: 


   Diabetes mellitus type: type 2 





(9) Chronic atrial fibrillation


Current Visit: Yes   Status: Chronic   





(10) Anticoagulant long-term use


Current Visit: Yes   Status: Chronic   





(11) Morbid obesity


Current Visit: Yes   Status: Chronic   





(12) Tobacco use


Current Visit: Yes   Status: Chronic

## 2019-03-26 NOTE — CONSULTATION
History of Present Illness





- Reason for Consult


Consult date: 03/26/19


acute renal failure, chronic renal failure





- History of Present Illness


the patient with CKD due to DM and HTN came to the ED last night due to 

wroseening swelling and water weight gain, he stated he has been on Torsemide 10

0 mg once a day which was decreased about a year ago to 50 mg, and few weeks ago

it was decreased to 40 mg once a day, slowly since then he started to have 

worsening swelling and weight gain about 40-50 lb, he is also mentioning 

worsening of SOB. renal consulted for CKD management 








Past History


Past Medical History: atrial fib, diabetes, heart failure, renal failure


Past Surgical History: No surgical history, Other (reviewed)


Social history: single, smoking.  denies: alcohol abuse, prescription drug abuse


Family history: hypertension





Medications and Allergies


                                    Allergies











Allergy/AdvReac Type Severity Reaction Status Date / Time


 


Penicillins Allergy  Rash Verified 05/09/17 08:30











                                Home Medications











 Medication  Instructions  Recorded  Confirmed  Last Taken  Type


 


AtorvaSTATin [Lipitor] 10 mg PO HS 05/09/17 03/25/19 05/08/17 History


 


Rivaroxaban [Xarelto] 20 mg PO QDAY 05/09/17 03/25/19 05/08/17 History


 


Carvedilol [Coreg] 3.125 mg PO BID #60 tablet 06/29/17 03/25/19 Unknown Rx


 


Digoxin [Digox] 250 mcg PO DAILY 03/25/19 03/25/19 Unknown History


 


Insulin Detemir [Levemir VIAL] 20 unit SQ QHS 03/25/19 03/25/19 Unknown History


 


Ipratropium/Albuterol Sulfate 1 spray IH QID 03/25/19 03/25/19 Unknown History





[Combivent Respimat]     


 


Lispro Insulin [Humalog] 8 unit SQ AC 03/25/19 03/25/19 Unknown History


 


Rivaroxaban [Xarelto] 20 mg PO QDAY 03/25/19 03/25/19 Unknown History


 


Spironolactone [Aldactone] 25 mg PO QDAY 03/25/19 03/25/19 Unknown History


 


Torsemide [Demadex] 20 mg PO DAILY 03/25/19 03/25/19 Unknown History


 


metOLazone [Metolazone] 5 mg PO DAILY 03/25/19 03/25/19 Unknown History


 


predniSONE [Deltasone] 20 mg PO QDAY 03/25/19 03/25/19 Unknown History











Active Meds: 


Active Medications





Acetaminophen (Tylenol)  650 mg PO Q4H PRN


   PRN Reason: Pain MILD(1-3)/Fever >100.5/HA


Albuterol (Proventil)  2.5 mg IH Q4HRT PRN


   PRN Reason: Shortness Of Breath


Atorvastatin Calcium (Lipitor)  10 mg PO [facility]


   Last Admin: 03/25/19 21:45 Dose:  10 mg


   Documented by: 


Bisacodyl (Dulcolax)  5 mg PO QDAY PRN


   PRN Reason: Constipation


   Stop: 03/27/19 23:59


Carvedilol (Coreg)  3.125 mg PO BID [facility]


   Last Admin: 03/26/19 12:50 Dose:  Not Given


   Documented by: 


Dextrose (D50w (25gm) Syringe)  50 ml IV PRN PRN


   PRN Reason: Hypoglycemia


Digoxin (Lanoxin)  0.25 mg PO DAILY [facility]


   Last Admin: 03/26/19 12:48 Dose:  0.25 mg


   Documented by: 


Furosemide (Lasix)  40 mg IV BID [facility]


Insulin Human Lispro (Humalog)  0 unit SUB-Q ACHS [facility]; Protocol


   Last Admin: 03/26/19 12:50 Dose:  2 unit


   Documented by: 


Metolazone (Zaroxolyn)  5 mg PO DAILY [facility]


   Last Admin: 03/26/19 12:51 Dose:  Not Given


   Documented by: 


Ondansetron HCl (Zofran)  4 mg IV Q8H PRN


   PRN Reason: Nausea And Vomiting


Prednisone (Deltasone)  20 mg PO QDAY [facility]


   Last Admin: 03/26/19 12:48 Dose:  20 mg


   Documented by: 


Rivaroxaban (Xarelto)  20 mg PO QDAY [facility]; Protocol


   Last Admin: 03/26/19 12:48 Dose:  20 mg


   Documented by: 


Senna/Docusate Sodium (Senokot S)  2 tab PO Q12H PRN


   PRN Reason: Laxative Effect


Sodium Chloride (Sodium Chloride Flush Syringe 10 Ml)  10 ml IV BID [facility]


   Last Admin: 03/26/19 12:51 Dose:  10 ml


   Documented by: 


Sodium Chloride (Sodium Chloride Flush Syringe 10 Ml)  10 ml IV PRN PRN


   PRN Reason: LINE FLUSH


Spironolactone (Aldactone)  25 mg PO QDAY [facility]


   Last Admin: 03/26/19 12:50 Dose:  Not Given


   Documented by: 











Review of Systems


All systems: negative (weight gaib, swelling, SOB)





Exam





- Vital Signs


Vital signs: 


                                   Vital Signs











Temp Pulse Resp BP Pulse Ox


 


 98.2 F   103 H  24   135/70   81 L


 


 03/25/19 11:58  03/25/19 11:58  03/25/19 11:58  03/25/19 11:58  03/25/19 11:58














- General Appearance


General appearance: well-developed, well-nourished, appears stated age, obese


EENT: ATNC, PERRL, mucous membranes moist


Neck: Present: neck supple


Respiratory: Clear to Ascultation, Decreased Breath Sounds


Heart: regular, S1S2


Gastrointestinal: Present: normoactive bowel sounds.  Absent: tenderness, 

distended


Integumentary: no rash, warm and dry


Neurologic: no focal deficit, no asterixis, alert and oriented x3


Musculoskeletal: Present: other (Anasarca)


Psychiatric: mood/affect appropriate, cooperative





Results





- Lab Results





                                 03/25/19 12:45





                                 03/26/19 04:54


                             Most recent lab results











Calcium  8.9 mg/dL (8.4-10.2)   03/26/19  04:54    


 


Magnesium  2.20 mg/dL (1.7-2.3)   03/25/19  12:45    














Assessment and Plan


Chronic kidney disease secondary to DM and HTN


Anasacra secondary to CHF and CKD


DM type II on insulin


HTN





- Cr baseline from 2017 was 1.9, appears to be stable, noted to have prote

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 

secondary GN work up as an outpatient 


- will d/c PO torsemide and lasix and start lasix 40 mg BID IV, cont aldactone 

and metolazone


- strict I&O


- daily weight


- renally dose emds


- avoid nephrotoxins


- renal diet








thank you for this consult, will follow closely with you.





Blayne Lane MD


[phone]

## 2019-03-27 LAB
BASOPHILS # (AUTO): 0 K/MM3 (ref 0–0.1)
BASOPHILS NFR BLD AUTO: 0.6 % (ref 0–1.8)
BUN SERPL-MCNC: 57 MG/DL (ref 9–20)
BUN/CREAT SERPL: 34 %
CALCIUM SERPL-MCNC: 8.6 MG/DL (ref 8.4–10.2)
EOSINOPHIL # BLD AUTO: 0 K/MM3 (ref 0–0.4)
EOSINOPHIL NFR BLD AUTO: 0.7 % (ref 0–4.3)
HCT VFR BLD CALC: 53.9 % (ref 35.5–45.6)
HEMOLYSIS INDEX: 14
HGB BLD-MCNC: 16.3 GM/DL (ref 11.8–15.2)
LYMPHOCYTES # BLD AUTO: 1.1 K/MM3 (ref 1.2–5.4)
LYMPHOCYTES NFR BLD AUTO: 17.3 % (ref 13.4–35)
MCH RBC QN AUTO: 26 PG (ref 28–32)
MCHC RBC AUTO-ENTMCNC: 30 % (ref 32–34)
MCV RBC AUTO: 84 FL (ref 84–94)
MONOCYTES # (AUTO): 0.7 K/MM3 (ref 0–0.8)
MONOCYTES % (AUTO): 11.2 % (ref 0–7.3)
PLATELET # BLD: 148 K/MM3 (ref 140–440)
RBC # BLD AUTO: 6.38 M/MM3 (ref 3.65–5.03)

## 2019-03-27 RX ADMIN — ASPIRIN SCH MG: 81 TABLET, CHEWABLE ORAL at 09:35

## 2019-03-27 RX ADMIN — SPIRONOLACTONE SCH MG: 25 TABLET ORAL at 09:35

## 2019-03-27 RX ADMIN — FUROSEMIDE SCH MG: 10 INJECTION, SOLUTION INTRAVENOUS at 09:35

## 2019-03-27 RX ADMIN — PREDNISONE SCH MG: 20 TABLET ORAL at 09:34

## 2019-03-27 RX ADMIN — FUROSEMIDE SCH MG: 10 INJECTION, SOLUTION INTRAVENOUS at 22:28

## 2019-03-27 RX ADMIN — RIVAROXABAN SCH MG: 20 TABLET, FILM COATED ORAL at 09:35

## 2019-03-27 RX ADMIN — CARVEDILOL SCH MG: 3.12 TABLET, FILM COATED ORAL at 09:35

## 2019-03-27 RX ADMIN — CARVEDILOL SCH MG: 3.12 TABLET, FILM COATED ORAL at 22:28

## 2019-03-27 RX ADMIN — Medication SCH ML: at 22:28

## 2019-03-27 RX ADMIN — DIGOXIN SCH MG: 250 TABLET ORAL at 09:35

## 2019-03-27 RX ADMIN — METOLAZONE SCH MG: 5 TABLET ORAL at 09:36

## 2019-03-27 NOTE — PROGRESS NOTE
Assessment and Plan


Echo reviewed - EF 60-65%, RV mod dilated, RA mild to mod dilated, mod pulm HTN 

with RVSP 67mmHg.  


Cont present cardiac management. 





The patient has been seen in conjunction with Dr. ELIER Cortes who agrees with the 

assessment and plan of care.








- Patient Problems


(1) Acute heart failure with preserved ejection fraction


Current Visit: Yes   Status: Acute   





(2) COPD (chronic obstructive pulmonary disease)


Current Visit: Yes   Status: Chronic   





(3) Chronic respiratory failure


Current Visit: Yes   Status: Chronic   





(4) Severe pulmonary arterial systolic hypertension


Current Visit: Yes   Status: Chronic   





(5) CKD (chronic kidney disease)


Current Visit: Yes   Status: Chronic   





(6) Hypertension


Current Visit: Yes   Status: Chronic   


Qualifiers: 


   Hypertension type: essential hypertension   Qualified Code(s): I10 - 

Essential (primary) hypertension   





(7) Hyperlipemia


Current Visit: Yes   Status: Chronic   





(8) Diabetes mellitus


Current Visit: Yes   Status: Chronic   


Qualifiers: 


   Diabetes mellitus type: type 2 





(9) Chronic atrial fibrillation


Current Visit: Yes   Status: Chronic   





(10) Anticoagulant long-term use


Current Visit: Yes   Status: Chronic   





(11) Morbid obesity


Current Visit: Yes   Status: Chronic   





(12) Tobacco use


Current Visit: Yes   Status: Chronic   





Subjective


Date of service: 03/27/19


Principal diagnosis: CKD, CHF


Interval history: 


pt sitting up at bedside, states he is feeling better. Had blister on LLE burst 

overnight and states wound care has been consulted. in AFib with CVR, SVR noted 

overnight, pt asymptomatic. 








Objective





                                Last Vital Signs











Temp  98.6 F   03/27/19 08:52


 


Pulse  64   03/27/19 08:52


 


Resp  20   03/27/19 08:52


 


BP  141/74   03/27/19 08:52


 


Pulse Ox  94   03/27/19 09:20

















- Physical Examination


General: No Apparent Distress


HEENT: Positive: PERRL, Normocephaly, Mucus Membranes Moist


Neck: Positive: neck supple, trachea midline


Cardiac: Positive: irregularly irregular, S1/S2


Lungs: Positive: Decreased Breath Sounds


Neuro: Positive: Grossly Intact


Abdomen: Negative: Tender


Skin: Positive: Other (BLE chronic venous stasis skin changes noted).  Negative:

Wound


Extremities: Present: +3 Edema (BLE), Other (LLE wound)





- Labs and Meds


                                       CBC











  03/27/19 Range/Units





  06:02 


 


WBC  6.5  (4.5-11.0)  K/mm3


 


RBC  6.38 H  (3.65-5.03)  M/mm3


 


Hgb  16.3 H  (11.8-15.2)  gm/dl


 


Hct  53.9 H  (35.5-45.6)  %


 


Plt Count  148  (140-440)  K/mm3


 


Lymph #  1.1 L  (1.2-5.4)  K/mm3


 


Mono #  0.7  (0.0-0.8)  K/mm3


 


Eos #  0.0  (0.0-0.4)  K/mm3


 


Baso #  0.0  (0.0-0.1)  K/mm3








                          Comprehensive Metabolic Panel











  03/27/19 Range/Units





  06:02 


 


Sodium  137  (137-145)  mmol/L


 


Potassium  4.8  (3.6-5.0)  mmol/L


 


Chloride  93.8 L  ()  mmol/L


 


Carbon Dioxide  34 H  (22-30)  mmol/L


 


BUN  57 H  (9-20)  mg/dL


 


Creatinine  1.7 H  (0.8-1.5)  mg/dL


 


Glucose  201 H  ()  mg/dL


 


Calcium  8.6  (8.4-10.2)  mg/dL














- Imaging and Cardiology


EKG: report reviewed, image reviewed


Echo: report reviewed (8/2016 showed EF 55-60%, abnormal diastolic function, 

mild LVH, LA mod dilated, RA mod dilated, RV mod dilated, mild MR, mod TR, 

severe pulm HTN with RVSP 97mmHg. )





- Telemetry


EKG Rhythm: Atrial Fibrillation

## 2019-03-27 NOTE — PROGRESS NOTE
Assessment and Plan


Assessment and plan: 


Acute on chronic resp failure due to CHF exacerbation.


Supplemental Oxygen





Acute on chronic diastolic CHF.


Continue Lasix iv


Coreg,


Aldactone


cardiology following





Acute on CKD.


Monitor 


Nephrology on board





Chronic afib


Continue Xarelto





DM type 2


Fingerstick glucose q ac and hs





HTN


Monitor BP





left ankle wound


Wound Nurse consulted





Full code status








History


Interval history: 


Less shortness of breath,


No chest pain


Left foot wound








Hospitalist Physical





- Physical exam


Narrative exam: 


GEN: Not in acute distress, sitting up in bed, morbidly obese


HEENT: Normocephalic, atraumatic, 


Neck: supple, No JVD


heart: S1 and S2 ireg,irreg,  no murmurs, rubs or gallop


Lungs: Bilateral basal crackles, no wheeze 


Abd:soft, non tender, non distended, normal bowel sounds


Ext: Bilateral lower ext edema, chronic changes, no cyanosis, 


Neuro:Awake,alert,oriented X 3, no focal signs, moves all ext


Psych: normal mood











- Constitutional


Vitals: 


                                        











Temp Pulse Resp BP Pulse Ox


 


 98.6 F   64   20   141/74   94 


 


 03/27/19 08:52  03/27/19 08:52  03/27/19 08:52  03/27/19 08:52  03/27/19 09:20











General appearance: Present: no acute distress





Results





- Labs


CBC & Chem 7: 


                                 03/27/19 06:02





                                 03/27/19 06:02


Labs: 


                             Laboratory Last Values











WBC  6.5 K/mm3 (4.5-11.0)   03/27/19  06:02    


 


RBC  6.38 M/mm3 (3.65-5.03)  H  03/27/19  06:02    


 


Hgb  16.3 gm/dl (11.8-15.2)  H  03/27/19  06:02    


 


Hct  53.9 % (35.5-45.6)  H  03/27/19  06:02    


 


MCV  84 fl (84-94)   03/27/19  06:02    


 


MCH  26 pg (28-32)  L  03/27/19  06:02    


 


MCHC  30 % (32-34)  L  03/27/19  06:02    


 


RDW  19.7 % (13.2-15.2)  H  03/27/19  06:02    


 


Plt Count  148 K/mm3 (140-440)   03/27/19  06:02    


 


Lymph % (Auto)  17.3 % (13.4-35.0)   03/27/19  06:02    


 


Mono % (Auto)  11.2 % (0.0-7.3)  H  03/27/19  06:02    


 


Eos % (Auto)  0.7 % (0.0-4.3)   03/27/19  06:02    


 


Baso % (Auto)  0.6 % (0.0-1.8)   03/27/19  06:02    


 


Lymph #  1.1 K/mm3 (1.2-5.4)  L  03/27/19  06:02    


 


Mono #  0.7 K/mm3 (0.0-0.8)   03/27/19  06:02    


 


Eos #  0.0 K/mm3 (0.0-0.4)   03/27/19  06:02    


 


Baso #  0.0 K/mm3 (0.0-0.1)   03/27/19  06:02    


 


Seg Neutrophils %  70.2 % (40.0-70.0)  H  03/27/19  06:02    


 


Seg Neutrophils #  4.6 K/mm3 (1.8-7.7)   03/27/19  06:02    


 


PT  14.2 Sec. (12.2-14.9)   03/25/19  12:45    


 


INR  1.04  (0.87-1.13)   03/25/19  12:45    


 


Sodium  137 mmol/L (137-145)   03/27/19  06:02    


 


Potassium  4.8 mmol/L (3.6-5.0)   03/27/19  06:02    


 


Chloride  93.8 mmol/L ()  L  03/27/19  06:02    


 


Carbon Dioxide  34 mmol/L (22-30)  H  03/27/19  06:02    


 


Anion Gap  14 mmol/L  03/27/19  06:02    


 


BUN  57 mg/dL (9-20)  H  03/27/19  06:02    


 


Creatinine  1.7 mg/dL (0.8-1.5)  H  03/27/19  06:02    


 


Estimated GFR  42 ml/min  03/27/19  06:02    


 


BUN/Creatinine Ratio  34 %  03/27/19  06:02    


 


Glucose  201 mg/dL ()  H  03/27/19  06:02    


 


POC Glucose  170  ()  H  03/27/19  12:33    


 


Hemoglobin A1c  9.1 % (4-6)  H  03/25/19  15:30    


 


Calcium  8.6 mg/dL (8.4-10.2)   03/27/19  06:02    


 


Magnesium  2.20 mg/dL (1.7-2.3)   03/25/19  12:45    


 


Total Bilirubin  0.80 mg/dL (0.1-1.2)   03/26/19  04:54    


 


AST  11 units/L (5-40)   03/26/19  04:54    


 


ALT  11 units/L (7-56)   03/26/19  04:54    


 


Alkaline Phosphatase  59 units/L ()   03/26/19  04:54    


 


Troponin T  < 0.010 ng/mL (0.00-0.029)   03/25/19  12:45    


 


NT-Pro-B Natriuret Pep  3763 pg/mL (0-900)  H  03/25/19  12:45    


 


Total Protein  5.9 g/dL (6.3-8.2)  L  03/26/19  04:54    


 


Albumin  3.0 g/dL (3.9-5)  L  03/26/19  04:54    


 


Albumin/Globulin Ratio  1.0 %  03/26/19  04:54    


 


TSH  2.240 mlU/mL (0.270-4.200)   03/25/19  15:30    


 


Free T4  1.15 ng/dL (0.76-1.46)   03/25/19  15:30    


 


Urine Color  Yellow  (Yellow)   03/25/19  12:35    


 


Urine Turbidity  Clear  (Clear)   03/25/19  12:35    


 


Urine pH  5.0  (5.0-7.0)   03/25/19  12:35    


 


Ur Specific Gravity  1.013  (1.003-1.030)   03/25/19  12:35    


 


Urine Protein  >2000 mg dl mg/dL (Negative)   03/25/19  12:35    


 


Urine Glucose (UA)  Neg mg/dL (Negative)   03/25/19  12:35    


 


Urine Ketones  Neg mg/dL (Negative)   03/25/19  12:35    


 


Urine Blood  Neg  (Negative)   03/25/19  12:35    


 


Urine Nitrite  Neg  (Negative)   03/25/19  12:35    


 


Urine Bilirubin  Neg  (Negative)   03/25/19  12:35    


 


Urine Urobilinogen  2.0 mg/dL (<2.0)   03/25/19  12:35    


 


Ur Leukocyte Esterase  Neg  (Negative)   03/25/19  12:35    


 


Urine WBC (Auto)  1.0 /HPF (0.0-6.0)   03/25/19  12:35    


 


Urine RBC (Auto)  3.0 /HPF (0.0-6.0)   03/25/19  12:35    


 


U Epithel Cells (Auto)  < 1.0 /HPF (0-13.0)   03/25/19  12:35    


 


Urine Mucus  Few /HPF  03/25/19  12:35    


 


Digoxin  0.9 ng/mL (0.9-2.0)   03/25/19  12:45    














Active Medications





- Current Medications


Current Medications: 














Generic Name Dose Route Start Last Admin





  Trade Name Freq  PRN Reason Stop Dose Admin


 


Acetaminophen  650 mg  03/25/19 13:39 





  Tylenol  PO  





  Q4H PRN  





  Pain MILD(1-3)/Fever >100.5/HA  


 


Albuterol  2.5 mg  03/25/19 13:39 





  Proventil  IH  





  Q4HRT PRN  





  Shortness Of Breath  


 


Aspirin  81 mg  03/27/19 10:00  03/27/19 09:35





  Baby Aspirin  PO   81 mg





  QDAY ALEM   Administration


 


Atorvastatin Calcium  10 mg  03/25/19 22:00  03/26/19 21:18





  Lipitor  PO   10 mg





  HS ALEM   Administration


 


Bisacodyl  5 mg  03/25/19 14:28 





  Dulcolax  PO  03/27/19 23:59 





  QDAY PRN  





  Constipation  


 


Carvedilol  3.125 mg  03/25/19 22:00  03/27/19 09:35





  Coreg  PO   3.125 mg





  BID ALEM   Administration


 


Dextrose  50 ml  03/25/19 13:52 





  D50w (25gm) Syringe  IV  





  PRN PRN  





  Hypoglycemia  


 


Digoxin  0.25 mg  03/26/19 10:00  03/27/19 09:35





  Lanoxin  PO   0.25 mg





  DAILY ALEM   Administration


 


Furosemide  40 mg  03/26/19 13:00  03/27/19 09:35





  Lasix  IV   40 mg





  BID ALEM   Administration


 


Insulin Human Lispro  0 unit  03/25/19 16:30  03/26/19 21:56





  Humalog  SUB-Q   3 unit





  ACHS ALEM   Administration





  Protocol  


 


Metolazone  5 mg  03/26/19 10:00  03/27/19 09:36





  Zaroxolyn  PO   5 mg





  DAILY ALEM   Administration


 


Ondansetron HCl  4 mg  03/25/19 13:39 





  Zofran  IV  





  Q8H PRN  





  Nausea And Vomiting  


 


Prednisone  20 mg  03/26/19 10:00  03/27/19 09:34





  Deltasone  PO   20 mg





  QDAY ALEM   Administration


 


Rivaroxaban  20 mg  03/26/19 10:00  03/27/19 09:35





  Xarelto  PO   20 mg





  QDAY ALEM   Administration





  Protocol  


 


Senna/Docusate Sodium  2 tab  03/25/19 14:27 





  Senokot S  PO  





  Q12H PRN  





  Laxative Effect  


 


Sodium Chloride  10 ml  03/25/19 22:00  03/26/19 21:20





  Sodium Chloride Flush Syringe 10 Ml  IV   10 ml





  BID ALEM   Administration


 


Sodium Chloride  10 ml  03/25/19 13:39 





  Sodium Chloride Flush Syringe 10 Ml  IV  





  PRN PRN  





  LINE FLUSH  


 


Spironolactone  25 mg  03/26/19 10:00  03/27/19 09:35





  Aldactone  PO   25 mg





  QDAY ALEM   Administration

## 2019-03-27 NOTE — PROGRESS NOTE
Assessment and Plan





Chronic kidney disease secondary to DM and HTN


Anasacra secondary to CHF and CKD


DM type II on insulin


Hypertension


Afib on Xarelto





-Renal function reviewed. Serum creatinine 1.7 today, yesterday's was 1.9, has 

good UOP of 1500 ml


-Baseline serum creatinine from 2017 was 1.9, appears to be stable, noted to 

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 

request secondary GN work up as an outpatient 


-Discontinued oral Torsemide yesterday


-On Lasix 40 mg IV BID


-Continue on Sprinolactone 25 mg po daily


-Continue on Metolazone 5 mg po daily


-Strict I&O


-Obtain daily weights


-Renally dose medications


-Avoid nephrotoxins


-Renal diet


-Continue to monitor renal function








Subjective


Date of service: 03/27/19


Principal diagnosis: CKD, CHF


Interval history: 





Patient seen in bathroom standing up. No family at beside. Reviewed renal plan 

of care.





Objective





- Vital Signs


Vital signs: 


                               Vital Signs - 12hr











  03/27/19 03/27/19 03/27/19





  00:34 05:10 08:52


 


Temperature 97.9 F 97.5 F L 98.6 F


 


Pulse Rate 67 58 L 64


 


Respiratory 20 18 20





Rate   


 


Blood Pressure 116/66 133/79 141/74


 


O2 Sat by Pulse 95 93 91





Oximetry   














  03/27/19





  09:20


 


Temperature 


 


Pulse Rate 


 


Respiratory 





Rate 


 


Blood Pressure 


 


O2 Sat by Pulse 94





Oximetry 














- General Appearance


General appearance: well-developed, appears stated age, fatigue


EENT: ATNC, PERRL, hearing intact, vision intact


Neck: no JVD, supple


Respiratory: Present: Decreased Breath Sounds


Cardiology: regular, S1S2


Gastrointestinal: normoactive bowel sounds


Integumentary: warm and dry, chronic venous stasis


Neurologic: alert and oriented x3


Musculoskeletal: joint swelling, other (has 2+ edema, has deann dressing to [facility] foot wound)





- Lab





                                 03/27/19 06:02





                                 03/27/19 06:02


                             Most recent lab results











Calcium  8.6 mg/dL (8.4-10.2)   03/27/19  06:02    


 


Magnesium  2.20 mg/dL (1.7-2.3)   03/25/19  12:45    














Medications & Allergies





- Medications


Allergies/Adverse Reactions: 


                                    Allergies





Penicillins Allergy (Verified 05/09/17 08:30)


   Rash








Home Medications: 


                                Home Medications











 Medication  Instructions  Recorded  Confirmed  Last Taken  Type


 


AtorvaSTATin [Lipitor] 10 mg PO HS 05/09/17 03/25/19 05/08/17 History


 


Rivaroxaban [Xarelto] 20 mg PO QDAY 05/09/17 03/25/19 05/08/17 History


 


Carvedilol [Coreg] 3.125 mg PO BID #60 tablet 06/29/17 03/25/19 Unknown Rx


 


Digoxin [Digox] 250 mcg PO DAILY 03/25/19 03/25/19 Unknown History


 


Insulin Detemir [Levemir VIAL] 20 unit SQ QHS 03/25/19 03/25/19 Unknown History


 


Ipratropium/Albuterol Sulfate 1 spray IH QID 03/25/19 03/25/19 Unknown History





[Combivent Respimat]     


 


Lispro Insulin [Humalog] 8 unit SQ AC 03/25/19 03/25/19 Unknown History


 


Rivaroxaban [Xarelto] 20 mg PO QDAY 03/25/19 03/25/19 Unknown History


 


Spironolactone [Aldactone] 25 mg PO QDAY 03/25/19 03/25/19 Unknown History


 


Torsemide [Demadex] 20 mg PO DAILY 03/25/19 03/25/19 Unknown History


 


metOLazone [Metolazone] 5 mg PO DAILY 03/25/19 03/25/19 Unknown History


 


predniSONE [Deltasone] 20 mg PO QDAY 03/25/19 03/25/19 Unknown History











Active Medications: 














Generic Name Dose Route Start Last Admin





  Trade Name Freq  PRN Reason Stop Dose Admin


 


Acetaminophen  650 mg  03/25/19 13:39 





  Tylenol  PO  





  Q4H PRN  





  Pain MILD(1-3)/Fever >100.5/HA  


 


Albuterol  2.5 mg  03/25/19 13:39 





  Proventil  IH  





  Q4HRT PRN  





  Shortness Of Breath  


 


Aspirin  81 mg  03/27/19 10:00  03/27/19 09:35





  Baby Aspirin  PO   81 mg





  QDAY ALEM   Administration


 


Atorvastatin Calcium  10 mg  03/25/19 22:00  03/26/19 21:18





  Lipitor  PO   10 mg





  HS ALEM   Administration


 


Bisacodyl  5 mg  03/25/19 14:28 





  Dulcolax  PO  03/27/19 23:59 





  QDAY PRN  





  Constipation  


 


Carvedilol  3.125 mg  03/25/19 22:00  03/27/19 09:35





  Coreg  PO   3.125 mg





  BID ALEM   Administration


 


Dextrose  50 ml  03/25/19 13:52 





  D50w (25gm) Syringe  IV  





  PRN PRN  





  Hypoglycemia  


 


Digoxin  0.25 mg  03/26/19 10:00  03/27/19 09:35





  Lanoxin  PO   0.25 mg





  DAILY ALEM   Administration


 


Furosemide  40 mg  03/26/19 13:00  03/27/19 09:35





  Lasix  IV   40 mg





  BID ALEM   Administration


 


Insulin Human Lispro  0 unit  03/25/19 16:30  03/26/19 21:56





  Humalog  SUB-Q   3 unit





  ACHS ALEM   Administration





  Protocol  


 


Metolazone  5 mg  03/26/19 10:00  03/27/19 09:36





  Zaroxolyn  PO   5 mg





  DAILY ALEM   Administration


 


Ondansetron HCl  4 mg  03/25/19 13:39 





  Zofran  IV  





  Q8H PRN  





  Nausea And Vomiting  


 


Prednisone  20 mg  03/26/19 10:00  03/27/19 09:34





  Deltasone  PO   20 mg





  QDAY ALEM   Administration


 


Rivaroxaban  20 mg  03/26/19 10:00  03/27/19 09:35





  Xarelto  PO   20 mg





  QDAY ALEM   Administration





  Protocol  


 


Senna/Docusate Sodium  2 tab  03/25/19 14:27 





  Senokot S  PO  





  Q12H PRN  





  Laxative Effect  


 


Sodium Chloride  10 ml  03/25/19 22:00  03/26/19 21:20





  Sodium Chloride Flush Syringe 10 Ml  IV   10 ml





  BID ALEM   Administration


 


Sodium Chloride  10 ml  03/25/19 13:39 





  Sodium Chloride Flush Syringe 10 Ml  IV  





  PRN PRN  





  LINE FLUSH  


 


Spironolactone  25 mg  03/26/19 10:00  03/27/19 09:35





  Aldactone  PO   25 mg





  QDAY ALEM   Administration

## 2019-03-28 LAB
BASOPHILS # (AUTO): 0.1 K/MM3 (ref 0–0.1)
BASOPHILS NFR BLD AUTO: 0.7 % (ref 0–1.8)
BUN SERPL-MCNC: 53 MG/DL (ref 9–20)
BUN/CREAT SERPL: 38 %
CALCIUM SERPL-MCNC: 9 MG/DL (ref 8.4–10.2)
EOSINOPHIL # BLD AUTO: 0.1 K/MM3 (ref 0–0.4)
EOSINOPHIL NFR BLD AUTO: 0.7 % (ref 0–4.3)
HCT VFR BLD CALC: 54.6 % (ref 35.5–45.6)
HEMOLYSIS INDEX: 13
HGB BLD-MCNC: 17.1 GM/DL (ref 11.8–15.2)
LYMPHOCYTES # BLD AUTO: 1.3 K/MM3 (ref 1.2–5.4)
LYMPHOCYTES NFR BLD AUTO: 16.7 % (ref 13.4–35)
MCH RBC QN AUTO: 26 PG (ref 28–32)
MCHC RBC AUTO-ENTMCNC: 31 % (ref 32–34)
MCV RBC AUTO: 84 FL (ref 84–94)
MONOCYTES # (AUTO): 0.6 K/MM3 (ref 0–0.8)
MONOCYTES % (AUTO): 7 % (ref 0–7.3)
PLATELET # BLD: 164 K/MM3 (ref 140–440)
RBC # BLD AUTO: 6.48 M/MM3 (ref 3.65–5.03)

## 2019-03-28 RX ADMIN — DIGOXIN SCH MG: 250 TABLET ORAL at 10:23

## 2019-03-28 RX ADMIN — METOLAZONE SCH MG: 5 TABLET ORAL at 10:23

## 2019-03-28 RX ADMIN — Medication SCH ML: at 22:29

## 2019-03-28 RX ADMIN — FUROSEMIDE SCH MG: 10 INJECTION, SOLUTION INTRAVENOUS at 22:28

## 2019-03-28 RX ADMIN — ASPIRIN SCH MG: 81 TABLET, CHEWABLE ORAL at 10:23

## 2019-03-28 RX ADMIN — CARVEDILOL SCH: 3.12 TABLET, FILM COATED ORAL at 10:20

## 2019-03-28 RX ADMIN — IPRATROPIUM BROMIDE AND ALBUTEROL SULFATE SCH AMPUL: .5; 3 SOLUTION RESPIRATORY (INHALATION) at 22:22

## 2019-03-28 RX ADMIN — IPRATROPIUM BROMIDE AND ALBUTEROL SULFATE SCH AMPUL: .5; 3 SOLUTION RESPIRATORY (INHALATION) at 14:56

## 2019-03-28 RX ADMIN — Medication SCH ML: at 10:19

## 2019-03-28 RX ADMIN — FUROSEMIDE SCH: 10 INJECTION, SOLUTION INTRAVENOUS at 10:21

## 2019-03-28 RX ADMIN — RIVAROXABAN SCH MG: 20 TABLET, FILM COATED ORAL at 10:23

## 2019-03-28 RX ADMIN — ACETAMINOPHEN PRN MG: 325 TABLET ORAL at 08:18

## 2019-03-28 RX ADMIN — ACETAMINOPHEN PRN MG: 325 TABLET ORAL at 17:28

## 2019-03-28 RX ADMIN — Medication SCH ML: at 10:23

## 2019-03-28 RX ADMIN — PREDNISONE SCH MG: 20 TABLET ORAL at 10:23

## 2019-03-28 RX ADMIN — SPIRONOLACTONE SCH: 25 TABLET ORAL at 10:19

## 2019-03-28 RX ADMIN — CARVEDILOL SCH MG: 3.12 TABLET, FILM COATED ORAL at 22:26

## 2019-03-28 NOTE — CONSULTATION
History of Present Illness


Consult date: 03/28/19


Reason for consult: other (Left heel ulcer)





- History of present illness


History of present illness: 





60 yo diabetic male with a bleeding left heel ulcer.





Past History


Past Medical History: atrial fib, COPD, diabetes, heart failure, hypertension, 

hyperlipidemia


Past Surgical History: No surgical history, Other (reviewed)


Social history: single, smoking.  denies: alcohol abuse, prescription drug abuse


Family history: hypertension





Medications and Allergies


                                    Allergies











Allergy/AdvReac Type Severity Reaction Status Date / Time


 


Penicillins Allergy  Rash Verified 05/09/17 08:30











                                Home Medications











 Medication  Instructions  Recorded  Confirmed  Last Taken  Type


 


AtorvaSTATin [Lipitor] 10 mg PO HS 05/09/17 03/25/19 05/08/17 History


 


Rivaroxaban [Xarelto] 20 mg PO QDAY 05/09/17 03/25/19 05/08/17 History


 


Carvedilol [Coreg] 3.125 mg PO BID #60 tablet 06/29/17 03/25/19 Unknown Rx


 


Digoxin [Digox] 250 mcg PO DAILY 03/25/19 03/25/19 Unknown History


 


Insulin Detemir [Levemir VIAL] 20 unit SQ QHS 03/25/19 03/25/19 Unknown History


 


Ipratropium/Albuterol Sulfate 1 spray IH QID 03/25/19 03/25/19 Unknown History





[Combivent Respimat]     


 


Lispro Insulin [Humalog] 8 unit SQ AC 03/25/19 03/25/19 Unknown History


 


Rivaroxaban [Xarelto] 20 mg PO QDAY 03/25/19 03/25/19 Unknown History


 


Spironolactone [Aldactone] 25 mg PO QDAY 03/25/19 03/25/19 Unknown History


 


Torsemide [Demadex] 20 mg PO DAILY 03/25/19 03/25/19 Unknown History


 


metOLazone [Metolazone] 5 mg PO DAILY 03/25/19 03/25/19 Unknown History


 


predniSONE [Deltasone] 20 mg PO QDAY 03/25/19 03/25/19 Unknown History











Active Meds: 


Active Medications





Acetaminophen (Tylenol)  650 mg PO Q4H PRN


   PRN Reason: Pain MILD(1-3)/Fever >100.5/HA


   Last Admin: 03/28/19 08:18 Dose:  650 mg


   Documented by: 


Albuterol (Proventil)  2.5 mg IH Q4HRT PRN


   PRN Reason: Shortness Of Breath


Albuterol/Ipratropium (Duoneb *Not For Prn Use*)  1 ampul IH TIDRT [facility]


Aspirin (Baby Aspirin)  81 mg PO QDAY [facility]


   Last Admin: 03/28/19 10:23 Dose:  81 mg


   Documented by: 


Atorvastatin Calcium (Lipitor)  10 mg PO HS [facility]


   Last Admin: 03/27/19 22:28 Dose:  10 mg


   Documented by: 


Carvedilol (Coreg)  3.125 mg PO BID [facility]


   Last Admin: 03/28/19 10:20 Dose:  Not Given


   Documented by: 


Dextrose (D50w (25gm) Syringe)  50 ml IV PRN PRN


   PRN Reason: Hypoglycemia


Digoxin (Lanoxin)  0.25 mg PO DAILY [facility]


   Last Admin: 03/28/19 10:23 Dose:  0.25 mg


   Documented by: 


Furosemide (Lasix)  40 mg IV BID [facility]


   Last Admin: 03/28/19 10:21 Dose:  Not Given


   Documented by: 


Insulin Human Lispro (Humalog)  0 unit SUB-Q ACHS [facility]; Protocol


   Last Admin: 03/28/19 11:35 Dose:  6 unit


   Documented by: 


Metolazone (Zaroxolyn)  5 mg PO DAILY [facility]


   Last Admin: 03/28/19 10:23 Dose:  5 mg


   Documented by: 


Ondansetron HCl (Zofran)  4 mg IV Q8H PRN


   PRN Reason: Nausea And Vomiting


Prednisone (Deltasone)  20 mg PO QDAY [facility]


   Last Admin: 03/28/19 10:23 Dose:  20 mg


   Documented by: 


Rivaroxaban (Xarelto)  20 mg PO QDAY [facility]; Protocol


   Last Admin: 03/28/19 10:23 Dose:  20 mg


   Documented by: 


Senna/Docusate Sodium (Senokot S)  2 tab PO Q12H PRN


   PRN Reason: Laxative Effect


Sodium Chloride (Sodium Chloride Flush Syringe 10 Ml)  10 ml IV BID [facility]


   Last Admin: 03/28/19 10:23 Dose:  10 ml


   Documented by: 


Sodium Chloride (Sodium Chloride Flush Syringe 10 Ml)  10 ml IV PRN PRN


   PRN Reason: LINE FLUSH


Spironolactone (Aldactone)  25 mg PO QDAY [facility]


   Last Admin: 03/28/19 10:19 Dose:  Not Given


   Documented by: 











Review of Systems


All systems: negative (none)





Exam


                                   Vital Signs











Temp Pulse Resp BP Pulse Ox


 


 98.2 F   103 H  24   135/70   81 L


 


 03/25/19 11:58  03/25/19 11:58  03/25/19 11:58  03/25/19 11:58  03/25/19 11:58














- General physical appearance


Positive: well developed, well nourished, no distress





- Eyes


Positive: PERRL, normal occular movement





- ENT


Positive: normal pinna, normal nares, normal mucosa, no hearing loss, no 

congestion





- Neck


Positive: no masses, no bruits, trachea midline, no venous distension





- Respiratory


Positive: normal expansion, normal respiratory effort, clear to auscultation





- Cardiovascular


Rhythm: regular


Heart Sounds: Present: S1 & S2.  Absent: rub, click





- Extremities


Extremities: no ischemia, pulses symmetrical, No edema





- Breasts


Breasts: deferred





- Abdomen


Abdomen: Present: soft, bowel sounds normal.  Absent: tender, distended


Hernia: none





- Genitourinary


Male Genitourinary: deferred





- Integumentary


other (Feet appear cyanotic.  The left DP is non-palpable.  There is a 3 X 3 cm 

dry ulcer over the left lateral malleolus.  )





- Neurologic


Neurologic: alert and oriented to time, place and person, motor strength and 

sensation are grossly intact





- Musculoskeletal


normal gait, normal posture





- Psychiatric


Psychiatric: appropriate mood/affect, intact judgment & insight





Results





- Labs





                                 03/28/19 04:27





                                 03/28/19 04:27


                              Abnormal lab results











  03/27/19 03/27/19 03/27/19 Range/Units





  12:33 17:08 21:56 


 


RBC     (3.65-5.03)  M/mm3


 


Hgb     (11.8-15.2)  gm/dl


 


Hct     (35.5-45.6)  %


 


MCH     (28-32)  pg


 


MCHC     (32-34)  %


 


RDW     (13.2-15.2)  %


 


Seg Neutrophils %     (40.0-70.0)  %


 


Chloride     ()  mmol/L


 


Carbon Dioxide     (22-30)  mmol/L


 


BUN     (9-20)  mg/dL


 


Glucose     ()  mg/dL


 


POC Glucose  170 H  284 H  179 H  ()  














  03/28/19 03/28/19 03/28/19 Range/Units





  00:32 04:27 04:27 


 


RBC   6.48 H   (3.65-5.03)  M/mm3


 


Hgb   17.1 H   (11.8-15.2)  gm/dl


 


Hct   54.6 H   (35.5-45.6)  %


 


MCH   26 L   (28-32)  pg


 


MCHC   31 L   (32-34)  %


 


RDW   19.5 H   (13.2-15.2)  %


 


Seg Neutrophils %   74.9 H   (40.0-70.0)  %


 


Chloride    92.7 L  ()  mmol/L


 


Carbon Dioxide    36 H  (22-30)  mmol/L


 


BUN    53 H  (9-20)  mg/dL


 


Glucose    148 H  ()  mg/dL


 


POC Glucose  164 H    ()  














  03/28/19 Range/Units





  08:38 


 


RBC   (3.65-5.03)  M/mm3


 


Hgb   (11.8-15.2)  gm/dl


 


Hct   (35.5-45.6)  %


 


MCH   (28-32)  pg


 


MCHC   (32-34)  %


 


RDW   (13.2-15.2)  %


 


Seg Neutrophils %   (40.0-70.0)  %


 


Chloride   ()  mmol/L


 


Carbon Dioxide   (22-30)  mmol/L


 


BUN   (9-20)  mg/dL


 


Glucose   ()  mg/dL


 


POC Glucose  125 H  ()  








                                 Diabetes panel











  03/28/19 Range/Units





  04:27 


 


Sodium  140  (137-145)  mmol/L


 


Potassium  4.9  (3.6-5.0)  mmol/L


 


Chloride  92.7 L  ()  mmol/L


 


Carbon Dioxide  36 H  (22-30)  mmol/L


 


BUN  53 H  (9-20)  mg/dL


 


Creatinine  1.4  (0.8-1.5)  mg/dL


 


Glucose  148 H  ()  mg/dL


 


Calcium  9.0  (8.4-10.2)  mg/dL








                                  Calcium panel











  03/28/19 Range/Units





  04:27 


 


Calcium  9.0  (8.4-10.2)  mg/dL








                                 Pituitary panel











  03/28/19 Range/Units





  04:27 


 


Sodium  140  (137-145)  mmol/L


 


Potassium  4.9  (3.6-5.0)  mmol/L


 


Chloride  92.7 L  ()  mmol/L


 


Carbon Dioxide  36 H  (22-30)  mmol/L


 


BUN  53 H  (9-20)  mg/dL


 


Creatinine  1.4  (0.8-1.5)  mg/dL


 


Glucose  148 H  ()  mg/dL


 


Calcium  9.0  (8.4-10.2)  mg/dL








                                  Adrenal panel











  03/28/19 Range/Units





  04:27 


 


Sodium  140  (137-145)  mmol/L


 


Potassium  4.9  (3.6-5.0)  mmol/L


 


Chloride  92.7 L  ()  mmol/L


 


Carbon Dioxide  36 H  (22-30)  mmol/L


 


BUN  53 H  (9-20)  mg/dL


 


Creatinine  1.4  (0.8-1.5)  mg/dL


 


Glucose  148 H  ()  mg/dL


 


Calcium  9.0  (8.4-10.2)  mg/dL














- Imaging


Additional studies: 





A1c was 9.1 on 3/25/19.





Assessment and Plan





- Patient Problems


(1) Non-pressure chronic ulcer of left ankle with fat layer exposed


Current Visit: Yes   Status: Acute   


Plan to address problem: 


1) LLE arterial dopplers


 2) Strict control of DM


 3) Wound care per Wound Care nurse


 4) F/u in [facility]

## 2019-03-28 NOTE — PROGRESS NOTE
Assessment and Plan


Assessment and plan: 


Acute on chronic resp failure due to CHF exacerbation.


Supplemental Oxygen





Acute on chronic diastolic CHF.


Continue Lasix iv


Coreg,


Aldactone


cardiology following





Acute on CKD.


Monitor 


Nephrology on board





Chronic afib


Continue Xarelto





DM type 2


Fingerstick glucose q ac and hs





HTN


Monitor BP





Left ankle wound


Wound Nurse consulted,


patient evaluated by Surgeon





Full code status





Not stable for discharge today.  Likely discharge in 1-2 days








History


Interval history: 


Less shortness of breath,


Still swelling both feet, legs


No chest pain


Left foot wound








Hospitalist Physical





- Physical exam


Narrative exam: 


GEN: Not in acute distress, sitting up in bed, morbidly obese


HEENT: Normocephalic, atraumatic, 


Neck: supple, No JVD


heart: S1 and S2 ireg,irreg,  no murmurs, rubs or gallop


Lungs: Bilateral basal crackles, no wheeze 


Abd:soft, non tender, non distended, normal bowel sounds


Ext: Bilateral lower ext edema, chronic changes, no cyanosis, 


Neuro:Awake,alert,oriented X 3, no focal signs, moves all ext


Psych: normal mood











- Constitutional


Vitals: 


                                        











Temp Pulse Resp BP Pulse Ox


 


 98.2 F   109 H  20   95/50   94 


 


 03/28/19 11:31  03/28/19 14:00  03/28/19 11:31  03/28/19 11:31  03/28/19 11:31











General appearance: Present: no acute distress





Results





- Labs


CBC & Chem 7: 


                                 03/28/19 04:27





                                 03/28/19 04:27


Labs: 


                             Laboratory Last Values











WBC  8.0 K/mm3 (4.5-11.0)   03/28/19  04:27    


 


RBC  6.48 M/mm3 (3.65-5.03)  H  03/28/19  04:27    


 


Hgb  17.1 gm/dl (11.8-15.2)  H  03/28/19  04:27    


 


Hct  54.6 % (35.5-45.6)  H  03/28/19  04:27    


 


MCV  84 fl (84-94)   03/28/19  04:27    


 


MCH  26 pg (28-32)  L  03/28/19  04:27    


 


MCHC  31 % (32-34)  L  03/28/19  04:27    


 


RDW  19.5 % (13.2-15.2)  H  03/28/19  04:27    


 


Plt Count  164 K/mm3 (140-440)   03/28/19  04:27    


 


Lymph % (Auto)  16.7 % (13.4-35.0)   03/28/19  04:27    


 


Mono % (Auto)  7.0 % (0.0-7.3)   03/28/19  04:27    


 


Eos % (Auto)  0.7 % (0.0-4.3)   03/28/19  04:27    


 


Baso % (Auto)  0.7 % (0.0-1.8)   03/28/19  04:27    


 


Lymph #  1.3 K/mm3 (1.2-5.4)   03/28/19  04:27    


 


Mono #  0.6 K/mm3 (0.0-0.8)   03/28/19  04:27    


 


Eos #  0.1 K/mm3 (0.0-0.4)   03/28/19  04:27    


 


Baso #  0.1 K/mm3 (0.0-0.1)   03/28/19  04:27    


 


Seg Neutrophils %  74.9 % (40.0-70.0)  H  03/28/19  04:27    


 


Seg Neutrophils #  6.0 K/mm3 (1.8-7.7)   03/28/19  04:27    


 


PT  14.2 Sec. (12.2-14.9)   03/25/19  12:45    


 


INR  1.04  (0.87-1.13)   03/25/19  12:45    


 


Sodium  140 mmol/L (137-145)   03/28/19  04:27    


 


Potassium  4.9 mmol/L (3.6-5.0)   03/28/19  04:27    


 


Chloride  92.7 mmol/L ()  L  03/28/19  04:27    


 


Carbon Dioxide  36 mmol/L (22-30)  H  03/28/19  04:27    


 


Anion Gap  16 mmol/L  03/28/19  04:27    


 


BUN  53 mg/dL (9-20)  H  03/28/19  04:27    


 


Creatinine  1.4 mg/dL (0.8-1.5)   03/28/19  04:27    


 


Estimated GFR  52 ml/min  03/28/19  04:27    


 


BUN/Creatinine Ratio  38 %  03/28/19  04:27    


 


Glucose  148 mg/dL ()  H  03/28/19  04:27    


 


POC Glucose  125  ()  H  03/28/19  08:38    


 


Hemoglobin A1c  9.1 % (4-6)  H  03/25/19  15:30    


 


Calcium  9.0 mg/dL (8.4-10.2)   03/28/19  04:27    


 


Magnesium  2.20 mg/dL (1.7-2.3)   03/25/19  12:45    


 


Total Bilirubin  0.80 mg/dL (0.1-1.2)   03/26/19  04:54    


 


AST  11 units/L (5-40)   03/26/19  04:54    


 


ALT  11 units/L (7-56)   03/26/19  04:54    


 


Alkaline Phosphatase  59 units/L ()   03/26/19  04:54    


 


Troponin T  < 0.010 ng/mL (0.00-0.029)   03/25/19  12:45    


 


NT-Pro-B Natriuret Pep  3763 pg/mL (0-900)  H  03/25/19  12:45    


 


Total Protein  5.9 g/dL (6.3-8.2)  L  03/26/19  04:54    


 


Albumin  3.0 g/dL (3.9-5)  L  03/26/19  04:54    


 


Albumin/Globulin Ratio  1.0 %  03/26/19  04:54    


 


TSH  2.240 mlU/mL (0.270-4.200)   03/25/19  15:30    


 


Free T4  1.15 ng/dL (0.76-1.46)   03/25/19  15:30    


 


Urine Color  Yellow  (Yellow)   03/25/19  12:35    


 


Urine Turbidity  Clear  (Clear)   03/25/19  12:35    


 


Urine pH  5.0  (5.0-7.0)   03/25/19  12:35    


 


Ur Specific Gravity  1.013  (1.003-1.030)   03/25/19  12:35    


 


Urine Protein  >2000 mg dl mg/dL (Negative)   03/25/19  12:35    


 


Urine Glucose (UA)  Neg mg/dL (Negative)   03/25/19  12:35    


 


Urine Ketones  Neg mg/dL (Negative)   03/25/19  12:35    


 


Urine Blood  Neg  (Negative)   03/25/19  12:35    


 


Urine Nitrite  Neg  (Negative)   03/25/19  12:35    


 


Urine Bilirubin  Neg  (Negative)   03/25/19  12:35    


 


Urine Urobilinogen  2.0 mg/dL (<2.0)   03/25/19  12:35    


 


Ur Leukocyte Esterase  Neg  (Negative)   03/25/19  12:35    


 


Urine WBC (Auto)  1.0 /HPF (0.0-6.0)   03/25/19  12:35    


 


Urine RBC (Auto)  3.0 /HPF (0.0-6.0)   03/25/19  12:35    


 


U Epithel Cells (Auto)  < 1.0 /HPF (0-13.0)   03/25/19  12:35    


 


Urine Mucus  Few /HPF  03/25/19  12:35    


 


Digoxin  0.9 ng/mL (0.9-2.0)   03/25/19  12:45    














Active Medications





- Current Medications


Current Medications: 














Generic Name Dose Route Start Last Admin





  Trade Name Freq  PRN Reason Stop Dose Admin


 


Acetaminophen  650 mg  03/25/19 13:39  03/28/19 08:18





  Tylenol  PO   650 mg





  Q4H PRN   Administration





  Pain MILD(1-3)/Fever >100.5/HA  


 


Albuterol  2.5 mg  03/25/19 13:39 





  Proventil  IH  





  Q4HRT PRN  





  Shortness Of Breath  


 


Albuterol/Ipratropium  1 ampul  03/28/19 14:00 





  Duoneb *Not For Prn Use*  IH  





  TIDRT ALEM  


 


Aspirin  81 mg  03/27/19 10:00  03/28/19 10:23





  Baby Aspirin  PO   81 mg





  QDAY ALEM   Administration


 


Atorvastatin Calcium  10 mg  03/25/19 22:00  03/27/19 22:28





  Lipitor  PO   10 mg





  HS ALEM   Administration


 


Carvedilol  3.125 mg  03/25/19 22:00  03/28/19 10:20





  Coreg  PO   Not Given





  BID ALEM  


 


Dextrose  50 ml  03/25/19 13:52 





  D50w (25gm) Syringe  IV  





  PRN PRN  





  Hypoglycemia  


 


Digoxin  0.25 mg  03/26/19 10:00  03/28/19 10:23





  Lanoxin  PO   0.25 mg





  DAILY ALEM   Administration


 


Furosemide  40 mg  03/26/19 13:00  03/28/19 10:21





  Lasix  IV   Not Given





  BID Pending sale to [facility]  


 


Insulin Human Lispro  0 unit  03/25/19 16:30  03/28/19 11:35





  Humalog  SUB-Q   6 unit





  [facility] ALEM   Administration





  Protocol  


 


Metolazone  5 mg  03/26/19 10:00  03/28/19 10:23





  Zaroxolyn  PO   5 mg





  DAILY ALEM   Administration


 


Ondansetron HCl  4 mg  03/25/19 13:39 





  Zofran  IV  





  Q8H PRN  





  Nausea And Vomiting  


 


Prednisone  20 mg  03/26/19 10:00  03/28/19 10:23





  Deltasone  PO   20 mg





  QDAY ALEM   Administration


 


Rivaroxaban  20 mg  03/26/19 10:00  03/28/19 10:23





  Xarelto  PO   20 mg





  QDAY ALEM   Administration





  Protocol  


 


Senna/Docusate Sodium  2 tab  03/25/19 14:27 





  Senokot S  PO  





  Q12H PRN  





  Laxative Effect  


 


Sodium Chloride  10 ml  03/25/19 22:00  03/28/19 10:23





  Sodium Chloride Flush Syringe 10 Ml  IV   10 ml





  BID ALEM   Administration


 


Sodium Chloride  10 ml  03/25/19 13:39 





  Sodium Chloride Flush Syringe 10 Ml  IV  





  PRN PRN  





  LINE FLUSH  


 


Spironolactone  25 mg  03/26/19 10:00  03/28/19 10:19





  Aldactone  PO   Not Given





  QDAY ALEM

## 2019-03-28 NOTE — PROGRESS NOTE
Assessment and Plan


Cont present cardiac management. 





The patient has been seen in conjunction with Dr. ELIER Cortes who agrees with the 

assessment and plan of care.








- Patient Problems


(1) Acute heart failure with preserved ejection fraction


Current Visit: Yes   Status: Acute   





(2) COPD (chronic obstructive pulmonary disease)


Current Visit: Yes   Status: Chronic   





(3) Chronic respiratory failure


Current Visit: Yes   Status: Chronic   





(4) Severe pulmonary arterial systolic hypertension


Current Visit: Yes   Status: Chronic   





(5) CKD (chronic kidney disease)


Current Visit: Yes   Status: Chronic   





(6) Hypertension


Current Visit: Yes   Status: Chronic   


Qualifiers: 


   Hypertension type: essential hypertension   Qualified Code(s): I10 - 

Essential (primary) hypertension   





(7) Hyperlipemia


Current Visit: Yes   Status: Chronic   





(8) Diabetes mellitus


Current Visit: Yes   Status: Chronic   


Qualifiers: 


   Diabetes mellitus type: type 2 





(9) Chronic atrial fibrillation


Current Visit: Yes   Status: Chronic   





(10) Anticoagulant long-term use


Current Visit: Yes   Status: Chronic   





(11) Morbid obesity


Current Visit: Yes   Status: Chronic   





(12) Tobacco use


Current Visit: Yes   Status: Chronic   





Subjective


Date of service: 03/28/19


Principal diagnosis: CKD, CHF


Interval history: 


pt sitting up at bedside, states he is feeling better. still with LLE 

blister/wound. in AFib with CVR.








Objective





                                Last Vital Signs











Temp  98.2 F   03/28/19 11:31


 


Pulse  109 H  03/28/19 14:00


 


Resp  20   03/28/19 11:31


 


BP  95/50   03/28/19 11:31


 


Pulse Ox  94   03/28/19 11:31

















- Physical Examination


General: No Apparent Distress


HEENT: Positive: PERRL, Normocephaly, Mucus Membranes Moist


Neck: Positive: neck supple, trachea midline


Cardiac: Positive: irregularly irregular, S1/S2


Lungs: Positive: Decreased Breath Sounds


Neuro: Positive: Grossly Intact


Abdomen: Negative: Tender


Skin: Positive: Other (BLE chronic venous stasis skin changes noted).  Negative:

Wound


Extremities: Present: +3 Edema (BLE), Other (LLE wound)





- Labs and Meds


                                       CBC











  03/28/19 Range/Units





  04:27 


 


WBC  8.0  (4.5-11.0)  K/mm3


 


RBC  6.48 H  (3.65-5.03)  M/mm3


 


Hgb  17.1 H  (11.8-15.2)  gm/dl


 


Hct  54.6 H  (35.5-45.6)  %


 


Plt Count  164  (140-440)  K/mm3


 


Lymph #  1.3  (1.2-5.4)  K/mm3


 


Mono #  0.6  (0.0-0.8)  K/mm3


 


Eos #  0.1  (0.0-0.4)  K/mm3


 


Baso #  0.1  (0.0-0.1)  K/mm3








                          Comprehensive Metabolic Panel











  03/28/19 Range/Units





  04:27 


 


Sodium  140  (137-145)  mmol/L


 


Potassium  4.9  (3.6-5.0)  mmol/L


 


Chloride  92.7 L  ()  mmol/L


 


Carbon Dioxide  36 H  (22-30)  mmol/L


 


BUN  53 H  (9-20)  mg/dL


 


Creatinine  1.4  (0.8-1.5)  mg/dL


 


Glucose  148 H  ()  mg/dL


 


Calcium  9.0  (8.4-10.2)  mg/dL














- Imaging and Cardiology


EKG: report reviewed, image reviewed


Echo: report reviewed (3/2019: EF 60-65%, RV mod dilated, RA mild to mod 

dilated, mod pulm HTN with RVSP 67mmHg. 8/2016 showed EF 55-60%, abnormal 

diastolic function, mild LVH, LA mod dilated, RA mod dilated, RV mod dilated, 

mild MR, mod TR, severe pulm HTN with RVSP 97mmHg. )





- Telemetry


EKG Rhythm: Atrial Fibrillation

## 2019-03-28 NOTE — PROGRESS NOTE
Assessment and Plan





Chronic kidney disease secondary to DM and HTN


Anasacra secondary to CHF and CKD


DM type II on insulin


Hypertension


Afib on Xarelto





-Renal function reviewed. Serum creatinine 1.4 today, yesterday's was 1.7, 

non-oliguric


-Noted to have proteinuria, which is chronic and likely due to DM nephropathy, 

will request secondary GN work up as an outpatient 


-Discontinued oral Torsemide


-On Lasix 40 mg IV BID


-Continue on Sprinolactone 25 mg po daily


-Continue on Metolazone 5 mg po daily


-Strict I&O monitoring


-Obtain daily weights


-Renally dose medications


-Avoid nephrotoxins


-Renal diet


-Continue to monitor renal function








Subjective


Date of service: 03/28/19


Principal diagnosis: CKD, CHF


Interval history: 





Patient seen sitting up at edge of bed with feet down on floor. Daughter at 

bedside. RN states she held Lasix this morning due to Hypotension..





Objective





- Vital Signs


Vital signs: 


                               Vital Signs - 12hr











  03/28/19 03/28/19 03/28/19





  08:34 08:35 10:00


 


Temperature 98.0 F  


 


Pulse Rate 89 98 H 


 


Pulse Rate [   97 H





Apical]   


 


Pulse Rate [   97 H





Left Radial]   


 


Pulse Rate [   





Posterior   





Bilateral]   


 


Pulse Rate [   97 H





Right Radial]   


 


Respiratory 18  19





Rate   


 


Respiratory   





Rate [Posterior   





Bilateral]   


 


Blood Pressure 112/59  


 


O2 Sat by Pulse 94 93 91





Oximetry   














  03/28/19 03/28/19 03/28/19





  10:19 10:20 10:23


 


Temperature   


 


Pulse Rate 97 H 97 H 97 H


 


Pulse Rate [   





Apical]   


 


Pulse Rate [   





Left Radial]   


 


Pulse Rate [   





Posterior   





Bilateral]   


 


Pulse Rate [   





Right Radial]   


 


Respiratory   





Rate   


 


Respiratory   





Rate [Posterior   





Bilateral]   


 


Blood Pressure 99/58 99/58 99/58


 


O2 Sat by Pulse   





Oximetry   














  03/28/19 03/28/19 03/28/19





  11:00 11:12 11:31


 


Temperature   98.2 F


 


Pulse Rate   79


 


Pulse Rate [   





Apical]   


 


Pulse Rate [   





Left Radial]   


 


Pulse Rate [ 89 92 H 





Posterior   





Bilateral]   


 


Pulse Rate [   





Right Radial]   


 


Respiratory   20





Rate   


 


Respiratory 18 18 





Rate [Posterior   





Bilateral]   


 


Blood Pressure   95/50


 


O2 Sat by Pulse   94





Oximetry   














- General Appearance


General appearance: well-developed, obese


EENT: ATNC, PERRL, hearing intact, vision intact


Neck: no JVD, supple


Respiratory: Present: Decreased Breath Sounds


Cardiology: regular, S1S2


Gastrointestinal: normoactive bowel sounds, obese


Integumentary: warm and dry, ulcer, chronic venous stasis


Neurologic: alert and oriented x3


Musculoskeletal: joint swelling, other (has 2-3+ edema to BLE)





- Lab





                                 03/28/19 04:27





                                 03/28/19 04:27


                             Most recent lab results











Calcium  9.0 mg/dL (8.4-10.2)   03/28/19  04:27    


 


Magnesium  2.20 mg/dL (1.7-2.3)   03/25/19  12:45    














Medications & Allergies





- Medications


Allergies/Adverse Reactions: 


                                    Allergies





Penicillins Allergy (Verified 05/09/17 08:30)


   Rash








Home Medications: 


                                Home Medications











 Medication  Instructions  Recorded  Confirmed  Last Taken  Type


 


AtorvaSTATin [Lipitor] 10 mg PO HS 05/09/17 03/25/19 05/08/17 History


 


Rivaroxaban [Xarelto] 20 mg PO QDAY 05/09/17 03/25/19 05/08/17 History


 


Carvedilol [Coreg] 3.125 mg PO BID #60 tablet 06/29/17 03/25/19 Unknown Rx


 


Digoxin [Digox] 250 mcg PO DAILY 03/25/19 03/25/19 Unknown History


 


Insulin Detemir [Levemir VIAL] 20 unit SQ QHS 03/25/19 03/25/19 Unknown History


 


Ipratropium/Albuterol Sulfate 1 spray IH QID 03/25/19 03/25/19 Unknown History





[Combivent Respimat]     


 


Lispro Insulin [Humalog] 8 unit SQ AC 03/25/19 03/25/19 Unknown History


 


Rivaroxaban [Xarelto] 20 mg PO QDAY 03/25/19 03/25/19 Unknown History


 


Spironolactone [Aldactone] 25 mg PO QDAY 03/25/19 03/25/19 Unknown History


 


Torsemide [Demadex] 20 mg PO DAILY 03/25/19 03/25/19 Unknown History


 


metOLazone [Metolazone] 5 mg PO DAILY 03/25/19 03/25/19 Unknown History


 


predniSONE [Deltasone] 20 mg PO QDAY 03/25/19 03/25/19 Unknown History











Active Medications: 














Generic Name Dose Route Start Last Admin





  Trade Name Freq  PRN Reason Stop Dose Admin


 


Acetaminophen  650 mg  03/25/19 13:39  03/28/19 08:18





  Tylenol  PO   650 mg





  Q4H PRN   Administration





  Pain MILD(1-3)/Fever >100.5/HA  


 


Albuterol  2.5 mg  03/25/19 13:39 





  Proventil  IH  





  Q4HRT PRN  





  Shortness Of Breath  


 


Albuterol/Ipratropium  1 ampul  03/28/19 14:00 





  Duoneb *Not For Prn Use*  IH  





  TIDRT ALEM  


 


Aspirin  81 mg  03/27/19 10:00  03/28/19 10:23





  Baby Aspirin  PO   81 mg





  QDAY ALEM   Administration


 


Atorvastatin Calcium  10 mg  03/25/19 22:00  03/27/19 22:28





  Lipitor  PO   10 mg





  HS ALEM   Administration


 


Carvedilol  3.125 mg  03/25/19 22:00  03/28/19 10:20





  Coreg  PO   Not Given





  BID [facility]  


 


Dextrose  50 ml  03/25/19 13:52 





  D50w (25gm) Syringe  IV  





  PRN PRN  





  Hypoglycemia  


 


Digoxin  0.25 mg  03/26/19 10:00  03/28/19 10:23





  Lanoxin  PO   0.25 mg





  DAILY ALEM   Administration


 


Furosemide  40 mg  03/26/19 13:00  03/28/19 10:21





  Lasix  IV   Not Given





  BID [facility]  


 


Insulin Human Lispro  0 unit  03/25/19 16:30  03/28/19 11:35





  Humalog  SUB-Q   6 unit





  ACHS [facility]   Administration





  Protocol  


 


Metolazone  5 mg  03/26/19 10:00  03/28/19 10:23





  Zaroxolyn  PO   5 mg





  DAILY ALEM   Administration


 


Ondansetron HCl  4 mg  03/25/19 13:39 





  Zofran  IV  





  Q8H PRN  





  Nausea And Vomiting  


 


Prednisone  20 mg  03/26/19 10:00  03/28/19 10:23





  Deltasone  PO   20 mg





  QDAY ALEM   Administration


 


Rivaroxaban  20 mg  03/26/19 10:00  03/28/19 10:23





  Xarelto  PO   20 mg





  QDAY ALEM   Administration





  Protocol  


 


Senna/Docusate Sodium  2 tab  03/25/19 14:27 





  Senokot S  PO  





  Q12H PRN  





  Laxative Effect  


 


Sodium Chloride  10 ml  03/25/19 22:00  03/28/19 10:23





  Sodium Chloride Flush Syringe 10 Ml  IV   10 ml





  BID ALEM   Administration


 


Sodium Chloride  10 ml  03/25/19 13:39 





  Sodium Chloride Flush Syringe 10 Ml  IV  





  PRN PRN  





  LINE FLUSH  


 


Spironolactone  25 mg  03/26/19 10:00  03/28/19 10:19





  Aldactone  PO   Not Given





  QDAY ALEM

## 2019-03-29 VITALS — DIASTOLIC BLOOD PRESSURE: 58 MMHG | SYSTOLIC BLOOD PRESSURE: 105 MMHG

## 2019-03-29 LAB
BASOPHILS # (AUTO): 0 K/MM3 (ref 0–0.1)
BASOPHILS NFR BLD AUTO: 0.2 % (ref 0–1.8)
BUN SERPL-MCNC: 57 MG/DL (ref 9–20)
BUN/CREAT SERPL: 32 %
CALCIUM SERPL-MCNC: 8.7 MG/DL (ref 8.4–10.2)
EOSINOPHIL # BLD AUTO: 0 K/MM3 (ref 0–0.4)
EOSINOPHIL NFR BLD AUTO: 0.3 % (ref 0–4.3)
HCT VFR BLD CALC: 49.9 % (ref 35.5–45.6)
HEMOLYSIS INDEX: 7
HGB BLD-MCNC: 15 GM/DL (ref 11.8–15.2)
LYMPHOCYTES # BLD AUTO: 1 K/MM3 (ref 1.2–5.4)
LYMPHOCYTES NFR BLD AUTO: 8.6 % (ref 13.4–35)
MCH RBC QN AUTO: 25 PG (ref 28–32)
MCHC RBC AUTO-ENTMCNC: 30 % (ref 32–34)
MCV RBC AUTO: 85 FL (ref 84–94)
MONOCYTES # (AUTO): 1.3 K/MM3 (ref 0–0.8)
MONOCYTES % (AUTO): 10.8 % (ref 0–7.3)
PLATELET # BLD: 142 K/MM3 (ref 140–440)
RBC # BLD AUTO: 5.9 M/MM3 (ref 3.65–5.03)

## 2019-03-29 RX ADMIN — IPRATROPIUM BROMIDE AND ALBUTEROL SULFATE SCH AMPUL: .5; 3 SOLUTION RESPIRATORY (INHALATION) at 07:26

## 2019-03-29 RX ADMIN — SPIRONOLACTONE SCH MG: 25 TABLET ORAL at 09:51

## 2019-03-29 RX ADMIN — DIGOXIN SCH MG: 250 TABLET ORAL at 09:52

## 2019-03-29 RX ADMIN — RIVAROXABAN SCH MG: 20 TABLET, FILM COATED ORAL at 09:51

## 2019-03-29 RX ADMIN — IPRATROPIUM BROMIDE AND ALBUTEROL SULFATE SCH AMPUL: .5; 3 SOLUTION RESPIRATORY (INHALATION) at 13:33

## 2019-03-29 RX ADMIN — Medication SCH ML: at 09:53

## 2019-03-29 RX ADMIN — METOLAZONE SCH MG: 5 TABLET ORAL at 09:51

## 2019-03-29 RX ADMIN — FUROSEMIDE SCH MG: 10 INJECTION, SOLUTION INTRAVENOUS at 09:50

## 2019-03-29 RX ADMIN — PREDNISONE SCH MG: 20 TABLET ORAL at 09:51

## 2019-03-29 RX ADMIN — ASPIRIN SCH MG: 81 TABLET, CHEWABLE ORAL at 09:51

## 2019-03-29 RX ADMIN — CARVEDILOL SCH MG: 3.12 TABLET, FILM COATED ORAL at 09:52

## 2019-03-29 NOTE — VASCULAR LAB REPORT
PROCEDURE: VL ARTERIAL DUPLEX LE LT 

 

TECHNIQUE: Gray scale, color and pulsed Doppler ultrasound with color flow and spectral analysis eval
uation of left lower extremity arteries were performed. 

 

HISTORY: left ankle ulcer 

 

COMPARISONS: None currently available. 

 

FINDINGS: 

 

RIGHT EXTREMITY: 

PTA, ROSALIO, and DPA velocities in cm/sec: 90, 94, and 84. Biphasic and triphasic flow. 

 

LEFT EXTREMITY:  

EIA, CFA, proximal SFA, DFA, mid SFA, distal SFA, popliteal, PTA, ROSALIO, and DPA velocities in cm/sec: 
170, 160, 141, 66, 106, 93, 109, 67, 110, and 90. Biphasic and triphasic flow throughout. Mild to mod
erate disease below the knee. 

 

IMPRESSION: 

*  No ultrasound evidence for hemodynamically significant stenosis. 

 

 

This document is electronically signed by Colt Braxton MD., March 29 2019 11:01:12 AM ET

## 2019-03-29 NOTE — PROGRESS NOTE
Assessment and Plan





clinically vastly improved


no complaints


wean O2 - may dc if ok on ra.


f/u with dr. spain


discussed the importance of medication and diet compliance at length





Subjective


Date of service: 03/29/19


Principal diagnosis: CKD, CHF


Interval history: 





feels much better





Objective


                                   Vital Signs











  Temp Pulse Pulse Resp Resp BP BP


 


 03/29/19 09:52   72     124/74 


 


 03/29/19 09:51   72     124/62 


 


 03/29/19 07:53  98.0 F  71   20   127/60 


 


 03/29/19 07:27    69   18  


 


 03/29/19 04:50   77     


 


 03/29/19 04:04  97.9 F  70   18   104/49 


 


 03/28/19 23:56  98.3 F  65   18    109/60


 


 03/28/19 22:26   65     


 


 03/28/19 22:23    76   18  


 


 03/28/19 22:00       


 


 03/28/19 20:00  98.2 F  65   18    95/57


 


 03/28/19 19:30   72     


 


 03/28/19 16:06  98.0 F  72   18   126/63 


 


 03/28/19 14:58    107 H   18  


 


 03/28/19 14:40    109 H   18  


 


 03/28/19 14:00   109 H     


 


 03/28/19 12:56   82     102/52 


 


 03/28/19 11:31  98.2 F  79   20   95/50 


 


 03/28/19 11:12    92 H   18  














  Pulse Ox


 


 03/29/19 09:52 


 


 03/29/19 09:51 


 


 03/29/19 07:53  93


 


 03/29/19 07:27  93


 


 03/29/19 04:50 


 


 03/29/19 04:04  94


 


 03/28/19 23:56  90


 


 03/28/19 22:26 


 


 03/28/19 22:23 


 


 03/28/19 22:00  91


 


 03/28/19 20:00  88


 


 03/28/19 19:30 


 


 03/28/19 16:06  95


 


 03/28/19 14:58 


 


 03/28/19 14:40 


 


 03/28/19 14:00 


 


 03/28/19 12:56  85


 


 03/28/19 11:31  94


 


 03/28/19 11:12 














- Physical Examination


General: No Apparent Distress


HEENT: Positive: PERRL, Normocephaly, Mucus Membranes Moist


Neck: Positive: neck supple, trachea midline


Neuro: Positive: Grossly Intact


Abdomen: Negative: Tender


Skin: Positive: Other (BLE chronic venous stasis skin changes noted).  Negative:

Wound


Extremities: Present: +3 Edema (BLE), Other (LLE wound)





- Labs and Meds


                                       CBC











  03/29/19 Range/Units





  05:34 


 


WBC  12.1 H  (4.5-11.0)  K/mm3


 


RBC  5.90 H  (3.65-5.03)  M/mm3


 


Hgb  15.0  (11.8-15.2)  gm/dl


 


Hct  49.9 H  (35.5-45.6)  %


 


Plt Count  142  (140-440)  K/mm3


 


Lymph #  1.0 L  (1.2-5.4)  K/mm3


 


Mono #  1.3 H  (0.0-0.8)  K/mm3


 


Eos #  0.0  (0.0-0.4)  K/mm3


 


Baso #  0.0  (0.0-0.1)  K/mm3








                          Comprehensive Metabolic Panel











  03/29/19 Range/Units





  05:34 


 


Sodium  140  (137-145)  mmol/L


 


Potassium  4.9  (3.6-5.0)  mmol/L


 


Chloride  93.9 L  ()  mmol/L


 


Carbon Dioxide  35 H  (22-30)  mmol/L


 


BUN  57 H  (9-20)  mg/dL


 


Creatinine  1.8 H  (0.8-1.5)  mg/dL


 


Glucose  142 H  ()  mg/dL


 


Calcium  8.7  (8.4-10.2)  mg/dL














- Imaging and Cardiology


EKG: report reviewed, image reviewed


Echo: report reviewed (3/2019: EF 60-65%, RV mod dilated, RA mild to mod 

dilated, mod pulm HTN with RVSP 67mmHg. 8/2016 showed EF 55-60%, abnormal 

diastolic function, mild LVH, LA mod dilated, RA mod dilated, RV mod dilated, 

mild MR, mod TR, severe pulm HTN with RVSP 97mmHg. )

## 2019-03-29 NOTE — DISCHARGE SUMMARY
Providers





- Providers


Date of Admission: 


03/25/19 13:39





Date of discharge: 03/29/19


Attending physician: 


KIKI FORREST





                                        





03/25/19 13:46


Consult to Physician [CONS] Routine 


   Comment: 


   Consulting Provider: ANDREW POLLARD


   Physician Instructions: 


   Reason For Exam: chf





03/25/19 14:22


Consult to Physician [CONS] Routine 


   Comment: 


   Consulting Provider: FARIHA LANE


   Physician Instructions: 


   Reason For Exam: renal failure





03/27/19 09:22


Consult to Wound/ET Nurse [CONS] Routine 


   Reason For Exam: wound eval











Primary care physician: 


JIMMY CHAUDHRY








Hospitalization


Condition: Fair


Disposition: DC-01 TO HOME OR SELFCARE





Core Measure Documentation





- Palliative Care


Palliative Care/ Comfort Measures: Not Applicable





- Core Measures


Any of the following diagnoses?: heart failure





- Heart Failure Discharge Requirements


ACE/ARB for LVSD if EF <40%: Not Applicable


Beta blocker at discharge: Yes





Exam





- Constitutional


Vitals: 


                                        











Temp Pulse Resp BP Pulse Ox


 


 98.0 F   88   20   105/58   89 


 


 03/29/19 11:44  03/29/19 11:44  03/29/19 11:44  03/29/19 11:44  03/29/19 11:44














Plan


Activity: advance as tolerated


Diet: low fat, low cholesterol, low salt, renal


Durable Medical Equipment Needed Upon Discharge: Oxygen


Additional Instructions: 1.Follow up with PCP in 1 week.  2.Follow up with Dr. Sandoval, cardiology in 1 week.  3.Follow up with Dr. Lane, nephrology in 1 week.

  4.Continue home Oxygen at 2l/min NC.  5.Follow up with Dr. Baig, wound 

clinic in 3-5 days


Follow up with: 


PRIMARY CARE,MD [Referring] - 3-5 Days


Prescriptions: 


Aspirin EC [Aspirin Enteric Coated TAB] 81 mg PO QDAY #30 tablet.


Furosemide [Lasix TAB] 40 mg PO BID #60 tablet

## 2019-03-29 NOTE — PROGRESS NOTE
Assessment and Plan


Chronic kidney disease secondary to DM and HTN


Anasacra secondary to CHF and CKD


DM type II on insulin


Hypertension


Afib on Xarelto





-will switch lasix to PO due to rising Cr since yesterday


-Noted to have proteinuria, which is chronic and likely due to DM nephropathy, 

will request secondary GN work up as an outpatient 


-Continue on Sprinolactone 25 mg po daily


-Continue on Metolazone 5 mg po daily


-Strict I&O monitoring


-Obtain daily weights


-Renally dose medications


-Avoid nephrotoxins


-Renal diet


-Continue to monitor renal function














Subjective


Date of service: 03/29/19


Principal diagnosis: CKD, CHF


Interval history: 


feels better, ready to go home








Objective





- Vital Signs


Vital signs: 


                               Vital Signs - 12hr











  03/28/19 03/29/19 03/29/19





  23:56 04:04 04:50


 


Temperature 98.3 F 97.9 F 


 


Pulse Rate 65 70 77


 


Pulse Rate [   





Posterior   





Bilateral]   


 


Respiratory 18 18 





Rate   


 


Respiratory   





Rate [Posterior   





Bilateral]   


 


Blood Pressure  104/49 


 


Blood Pressure 109/60  





[Right]   


 


O2 Sat by Pulse 90 94 





Oximetry   














  03/29/19 03/29/19 03/29/19





  07:27 07:53 09:51


 


Temperature  98.0 F 


 


Pulse Rate  71 72


 


Pulse Rate [ 69  





Posterior   





Bilateral]   


 


Respiratory  20 





Rate   


 


Respiratory 18  





Rate [Posterior   





Bilateral]   


 


Blood Pressure  127/60 124/62


 


Blood Pressure   





[Right]   


 


O2 Sat by Pulse 93 93 





Oximetry   














  03/29/19





  09:52


 


Temperature 


 


Pulse Rate 72


 


Pulse Rate [ 





Posterior 





Bilateral] 


 


Respiratory 





Rate 


 


Respiratory 





Rate [Posterior 





Bilateral] 


 


Blood Pressure 124/74


 


Blood Pressure 





[Right] 


 


O2 Sat by Pulse 





Oximetry 














- General Appearance


General appearance: well-developed, well-nourished, appears stated age


EENT: ATNC, PERRL, mucous membranes moist


Neck: no JVD, no carotid bruit


Respiratory: Present: Clear to Ascultation.  Absent: Rales, Ronchi


Cardiology: regular, S1S2


Gastrointestinal: normoactive bowel sounds, no tenderness, no distended


Integumentary: no rash, warm and dry


Neurologic: no focal deficit, no asterixis, alert and oriented x3


Musculoskeletal: other (trace ptiitng edema in BLE)


Psychiatric: mood/affect appropriate, cooperative





- Lab





                                 03/29/19 05:34





                                 03/29/19 05:34


                             Most recent lab results











Calcium  8.7 mg/dL (8.4-10.2)   03/29/19  05:34    


 


Magnesium  2.10 mg/dL (1.7-2.3)   03/29/19  05:34    














Medications & Allergies





- Medications


Allergies/Adverse Reactions: 


                                    Allergies





Penicillins Allergy (Verified 05/09/17 08:30)


   Rash








Home Medications: 


                                Home Medications











 Medication  Instructions  Recorded  Confirmed  Last Taken  Type


 


AtorvaSTATin [Lipitor] 10 mg PO HS 05/09/17 03/25/19 05/08/17 History


 


Rivaroxaban [Xarelto] 20 mg PO QDAY 05/09/17 03/25/19 05/08/17 History


 


Carvedilol [Coreg] 3.125 mg PO BID #60 tablet 06/29/17 03/25/19 Unknown Rx


 


Digoxin [Digox] 250 mcg PO DAILY 03/25/19 03/25/19 Unknown History


 


Insulin Detemir [Levemir VIAL] 20 unit SQ QHS 03/25/19 03/25/19 Unknown History


 


Ipratropium/Albuterol Sulfate 1 spray IH QID 03/25/19 03/25/19 Unknown History





[Combivent Respimat]     


 


Lispro Insulin [Humalog] 8 unit SQ AC 03/25/19 03/25/19 Unknown History


 


Rivaroxaban [Xarelto] 20 mg PO QDAY 03/25/19 03/25/19 Unknown History


 


Spironolactone [Aldactone] 25 mg PO QDAY 03/25/19 03/25/19 Unknown History


 


metOLazone [Metolazone] 5 mg PO DAILY 03/25/19 03/25/19 Unknown History


 


predniSONE [Deltasone] 20 mg PO QDAY 03/25/19 03/25/19 Unknown History


 


Aspirin EC [Aspirin Enteric Coated 81 mg PO QDAY #30 tablet. 03/29/19  Unknown

 Rx





TAB]     


 


Furosemide [Lasix TAB] 40 mg PO BID #60 tablet 03/29/19  Unknown Rx











Active Medications: 














Generic Name Dose Route Start Last Admin





  Trade Name Freq  PRN Reason Stop Dose Admin


 


Acetaminophen  650 mg  03/25/19 13:39  03/28/19 17:28





  Tylenol  PO   650 mg





  Q4H PRN   Administration





  Pain MILD(1-3)/Fever >100.5/HA  


 


Albuterol  2.5 mg  03/25/19 13:39 





  Proventil  IH  





  Q4HRT PRN  





  Shortness Of Breath  


 


Albuterol/Ipratropium  1 ampul  03/28/19 14:00  03/29/19 07:26





  Duoneb *Not For Prn Use*  IH   1 ampul





  TIDRT ALEM   Administration


 


Aspirin  81 mg  03/27/19 10:00  03/29/19 09:51





  Baby Aspirin  PO   81 mg





  QDAY ALEM   Administration


 


Atorvastatin Calcium  10 mg  03/25/19 22:00  03/28/19 22:26





  Lipitor  PO   10 mg





  HS ALEM   Administration


 


Carvedilol  3.125 mg  03/25/19 22:00  03/29/19 09:52





  Coreg  PO   3.125 mg





  BID ALEM   Administration


 


Dextrose  50 ml  03/25/19 13:52 





  D50w (25gm) Syringe  IV  





  PRN PRN  





  Hypoglycemia  


 


Digoxin  0.25 mg  03/26/19 10:00  03/29/19 09:52





  Lanoxin  PO   0.25 mg





  DAILY ALEM   Administration


 


Furosemide  40 mg  03/29/19 18:00 





  Lasix  PO  





  0600,1800 [facility]  


 


Insulin Human Lispro  0 unit  03/25/19 16:30  03/29/19 09:53





  Humalog  SUB-Q   Not Given





  ACHS [facility]  





  Protocol  


 


Metolazone  5 mg  03/26/19 10:00  03/29/19 09:51





  Zaroxolyn  PO   5 mg





  DAILY [facility]   Administration


 


Ondansetron HCl  4 mg  03/25/19 13:39 





  Zofran  IV  





  Q8H PRN  





  Nausea And Vomiting  


 


Prednisone  20 mg  03/26/19 10:00  03/29/19 09:51





  Deltasone  PO   20 mg





  QDAY [facility]   Administration


 


Rivaroxaban  20 mg  03/26/19 10:00  03/29/19 09:51





  Xarelto  PO   20 mg





  QDAY [facility]   Administration





  Protocol  


 


Senna/Docusate Sodium  2 tab  03/25/19 14:27 





  Senokot S  PO  





  Q12H PRN  





  Laxative Effect  


 


Sodium Chloride  10 ml  03/25/19 22:00  03/29/19 09:53





  Sodium Chloride Flush Syringe 10 Ml  IV   10 ml





  BID ALEM   Administration


 


Sodium Chloride  10 ml  03/25/19 13:39 





  Sodium Chloride Flush Syringe 10 Ml  IV  





  PRN PRN  





  LINE FLUSH  


 


Spironolactone  25 mg  03/26/19 10:00  03/29/19 09:51





  Aldactone  PO   25 mg





  QDAY ALEM   Administration